# Patient Record
Sex: MALE | Race: WHITE | HISPANIC OR LATINO
[De-identification: names, ages, dates, MRNs, and addresses within clinical notes are randomized per-mention and may not be internally consistent; named-entity substitution may affect disease eponyms.]

---

## 2017-01-11 ENCOUNTER — APPOINTMENT (OUTPATIENT)
Dept: PULMONOLOGY | Facility: CLINIC | Age: 82
End: 2017-01-11

## 2017-01-11 VITALS
DIASTOLIC BLOOD PRESSURE: 70 MMHG | BODY MASS INDEX: 27.63 KG/M2 | SYSTOLIC BLOOD PRESSURE: 130 MMHG | OXYGEN SATURATION: 98 % | HEIGHT: 70 IN | WEIGHT: 193 LBS | HEART RATE: 74 BPM

## 2017-07-12 ENCOUNTER — APPOINTMENT (OUTPATIENT)
Dept: PULMONOLOGY | Facility: CLINIC | Age: 82
End: 2017-07-12

## 2017-08-14 ENCOUNTER — APPOINTMENT (OUTPATIENT)
Dept: DERMATOLOGY | Facility: CLINIC | Age: 82
End: 2017-08-14
Payer: MEDICARE

## 2017-08-14 PROCEDURE — 99202 OFFICE O/P NEW SF 15 MIN: CPT

## 2018-01-19 ENCOUNTER — APPOINTMENT (OUTPATIENT)
Dept: PULMONOLOGY | Facility: CLINIC | Age: 83
End: 2018-01-19
Payer: MEDICARE

## 2018-01-19 VITALS
WEIGHT: 183 LBS | BODY MASS INDEX: 26.26 KG/M2 | DIASTOLIC BLOOD PRESSURE: 70 MMHG | SYSTOLIC BLOOD PRESSURE: 128 MMHG | OXYGEN SATURATION: 98 % | HEART RATE: 65 BPM

## 2018-01-19 PROCEDURE — 99214 OFFICE O/P EST MOD 30 MIN: CPT | Mod: 25

## 2018-01-19 PROCEDURE — 94010 BREATHING CAPACITY TEST: CPT

## 2018-01-19 RX ORDER — NYSTATIN 100000 U/G
100000 OINTMENT TOPICAL
Qty: 60 | Refills: 0 | Status: DISCONTINUED | COMMUNITY
Start: 2017-08-22

## 2018-01-19 RX ORDER — NAPROXEN SODIUM 220 MG
220 TABLET ORAL
Refills: 0 | Status: ACTIVE | COMMUNITY

## 2018-05-30 ENCOUNTER — APPOINTMENT (OUTPATIENT)
Dept: DERMATOLOGY | Facility: CLINIC | Age: 83
End: 2018-05-30
Payer: MEDICARE

## 2018-05-30 PROCEDURE — 11900 INJECT SKIN LESIONS </W 7: CPT

## 2018-05-30 PROCEDURE — 99212 OFFICE O/P EST SF 10 MIN: CPT | Mod: 25

## 2018-07-05 ENCOUNTER — APPOINTMENT (OUTPATIENT)
Dept: DERMATOLOGY | Facility: CLINIC | Age: 83
End: 2018-07-05

## 2018-07-25 ENCOUNTER — APPOINTMENT (OUTPATIENT)
Dept: PULMONOLOGY | Facility: CLINIC | Age: 83
End: 2018-07-25

## 2018-10-18 ENCOUNTER — APPOINTMENT (OUTPATIENT)
Dept: PULMONOLOGY | Facility: CLINIC | Age: 83
End: 2018-10-18
Payer: MEDICARE

## 2018-10-18 VITALS
DIASTOLIC BLOOD PRESSURE: 76 MMHG | OXYGEN SATURATION: 99 % | SYSTOLIC BLOOD PRESSURE: 132 MMHG | WEIGHT: 178 LBS | HEART RATE: 90 BPM | BODY MASS INDEX: 25.54 KG/M2

## 2018-10-18 PROCEDURE — 94010 BREATHING CAPACITY TEST: CPT

## 2018-10-18 PROCEDURE — 99215 OFFICE O/P EST HI 40 MIN: CPT | Mod: 25

## 2018-10-18 RX ORDER — CHROMIUM 200 MCG
TABLET ORAL
Refills: 0 | Status: ACTIVE | COMMUNITY

## 2018-10-18 RX ORDER — ASCORBIC ACID 500 MG
TABLET ORAL
Refills: 0 | Status: ACTIVE | COMMUNITY

## 2019-01-09 ENCOUNTER — APPOINTMENT (OUTPATIENT)
Dept: ORTHOPEDIC SURGERY | Facility: CLINIC | Age: 84
End: 2019-01-09
Payer: MEDICARE

## 2019-01-09 VITALS
TEMPERATURE: 98.5 F | BODY MASS INDEX: 25.62 KG/M2 | HEART RATE: 88 BPM | WEIGHT: 179 LBS | HEIGHT: 70 IN | SYSTOLIC BLOOD PRESSURE: 146 MMHG | DIASTOLIC BLOOD PRESSURE: 86 MMHG

## 2019-01-09 DIAGNOSIS — M25.562 PAIN IN RIGHT KNEE: ICD-10-CM

## 2019-01-09 DIAGNOSIS — M25.561 PAIN IN RIGHT KNEE: ICD-10-CM

## 2019-01-09 PROCEDURE — 73562 X-RAY EXAM OF KNEE 3: CPT | Mod: 50

## 2019-01-09 PROCEDURE — 99204 OFFICE O/P NEW MOD 45 MIN: CPT | Mod: 25

## 2019-01-09 PROCEDURE — 20610 DRAIN/INJ JOINT/BURSA W/O US: CPT | Mod: 50

## 2019-01-09 RX ORDER — ERYTHROMYCIN 5 MG/G
5 OINTMENT OPHTHALMIC
Qty: 4 | Refills: 0 | Status: ACTIVE | COMMUNITY
Start: 2018-07-09

## 2019-01-09 RX ORDER — NEOMYCIN SULFATE, POLYMYXIN B SULFATE AND DEXAMETHASONE 3.5; 10000; 1 MG/ML; [USP'U]/ML; MG/ML
3.5-10000-0.1 SUSPENSION OPHTHALMIC
Qty: 5 | Refills: 0 | Status: ACTIVE | COMMUNITY
Start: 2018-08-31

## 2019-01-09 RX ORDER — MELOXICAM 15 MG/1
15 TABLET ORAL
Qty: 30 | Refills: 0 | Status: ACTIVE | COMMUNITY
Start: 2018-12-17

## 2019-01-09 RX ORDER — ISOSORBIDE MONONITRATE 30 MG/1
30 TABLET, EXTENDED RELEASE ORAL
Qty: 30 | Refills: 0 | Status: ACTIVE | COMMUNITY
Start: 2018-10-19

## 2019-01-09 RX ORDER — METHYLPRED ACET/NACL,ISO-OS/PF 40 MG/ML
40 VIAL (ML) INJECTION
Qty: 2 | Refills: 0 | Status: ACTIVE | COMMUNITY
Start: 2019-01-09

## 2019-01-09 NOTE — DISCUSSION/SUMMARY
[de-identified] : 89 year old male present in the office today in regards to his BL arthritis. Discussed at length the natural history of degenerative arthritis and reviewed non-operative and operative treatment. At this point I suggested physiotherapy, NSAIDs and/or Tylenol, and corticosteroid injections. I have offered him physical therapy, but pt has deferred.  He elected to receive an injection today. We talked about an injection. Discussed at length with the patient the planned steroid and lidocaine injection. The risks, benefits, convalescence and alternatives were reviewed. The possible side effects discussed included but were not limited to: pain, swelling, heat and redness. There symptoms are generally mild but if they are extensive then contact the office. Giving pain relievers by mouth such as NSAIDs or Tylenol can generally treat the reactions to steroid and lidocaine. Rare cases of infection have been noted. Rash, hives and itching may occur post injection. If you have muscle pain or cramps, flushing and or swelling of the face, rapid heartbeat, nausea, dizziness, fever, chills, headache, difficulty breathing, swelling in the arms or legs, or have a prickly feeling of your skin, contact a health care provider immediately. In the future if the pain becomes disabling may need total knee arthroplasty. FU in 8 weeks.\par \par \par \par \par

## 2019-01-09 NOTE — PROCEDURE
[de-identified] : The BL knee was prepped with Betadine and under sterile condition the 40 mg Depo-Medrol and then 5 cc Lidocaine and 20 cc Marcaine injection was performed with a 21 gauge needle. The needle was introduced into the joint, aspiration was performed to ensure intra-articular placement and the medication was injected. Upon withdrawal of the needle the site was cleaned with alcohol and a band aid applied. The patient tolerated the injection well and there were no adverse effects. Post injection instructions included no strenuous activity for 24 hours, cryotherapy and if there are any adverse effects to contact the office.\par \par \par

## 2019-01-09 NOTE — ADDENDUM
[FreeTextEntry1] : Documented by Vashti Stacy acting as a scribe for Dr. Steven on 01/09/2019 \par \par All medical record entries made by the Scribe were at my, Dr. Steven, direction and\par personally dictated by me on 01/09/2019 . I have reviewed the chart and agree that the record\par accurately reflects my personal performance of the history, physical exam, procedure and imaging.\par

## 2019-01-09 NOTE — PHYSICAL EXAM
[LE] : Sensory: Intact in bilateral lower extremities [DP] : dorsalis pedis 2+ and symmetric bilaterally [PT] : posterior tibial 2+ and symmetric bilaterally [Poor Appearance] : well-appearing [Acute Distress] : not in acute distress [de-identified] : General appearance: Well nourished, well developed, pleasant, alert, and oriented x3.\par Respiratory: Breathing not labored, in no acute distress.\par HEENT: Normocephalic. EOM intact. Sclerae are clear.\par CV: No apparent abnormalities. No lower leg edema. No varicosities. Pedal pulses are palpable.\par Neurologic: Sensation is intact to light touch in the upper and lower extremities. No muscle weakness.\par Dermatologic: No apparent skin lesions or rash.\par Spine: C spine and L spine appear normal and move freely, normal and nontender.\par Upper Extremities: Hands, wrists, and elbows are normal and move freely. Shoulders are normal and move freely. All range of motion is symmetrical.\par Normal body habitus. Pulses are palpable.\par Review of systems, please see form for complete details. Medical data sheet was reviewed.\par  \par Left knee, FROM hip, no effusion, 0 - 125 degrees, mild crepitus, no medial pain, no lateral pain, no Lachman, no pivot shift, no anterior drawer, no posterior drawer, stable, anatomic alignment. \par Right knee, FROM hip, minimal effusion, 0 - 125 degrees, no crepitus, mild medial pain, no lateral pain, no Lachman, no pivot shift, no anterior drawer, no posterior drawer, stable, varus alignment. 1QG. \par \par  [de-identified] : Xrays, taken at the office today show:\par Right Knee xrays:\par Standing AP, Lateral, and Merchant films:\par DJD, decreased medial joint space, varus alignment, grade 4 changes bilaterally, moderate patellofemoral arthritis\par \par Left Knee xrays:\par Standing AP, Lateral, and Merchant films:\par DJD, decreased medial joint space, varus alignment, grade 4 changes bilaterally, moderate patellofemoral arthritis\par \par \par \par

## 2019-01-09 NOTE — REASON FOR VISIT
[Initial Visit] : an initial visit for [Knee Pain] : knee pain [FreeTextEntry2] : Bilateral knee pain

## 2019-01-09 NOTE — HISTORY OF PRESENT ILLNESS
[Pain Location] : pain [de-identified] : Patient is a 89 year old male who presents c/o chronic bilateral knee pain, had injections 2 months ago which increased pain. patient locates pain to anterior knees bilaterally, describes as an ache. positive buckling and swelling bilaterally, no clicking or locking  patient uses Advil due to pain

## 2019-03-20 ENCOUNTER — APPOINTMENT (OUTPATIENT)
Dept: ORTHOPEDIC SURGERY | Facility: CLINIC | Age: 84
End: 2019-03-20
Payer: MEDICARE

## 2019-03-20 DIAGNOSIS — M17.0 BILATERAL PRIMARY OSTEOARTHRITIS OF KNEE: ICD-10-CM

## 2019-03-20 PROCEDURE — 99213 OFFICE O/P EST LOW 20 MIN: CPT

## 2019-03-20 NOTE — DISCUSSION/SUMMARY
[de-identified] : 89 year old male presents with bilateral knee DJD. Discussed at length the natural history of degenerative arthritis and reviewed non-operative and operative treatment. He did well with injections. He will continue Tylenol, ice.  We may consider hyaluronic acid injections if cortisone fails to improve symptoms. In the future if the pain becomes disabling may need total knee arthroplasty. The patient will follow up in 2 months with Dr. Moore due to my leaving [facility].

## 2019-03-20 NOTE — PHYSICAL EXAM
[Normal] : Gait: normal [LE] : Sensory: Intact in bilateral lower extremities [DP] : dorsalis pedis 2+ and symmetric bilaterally [PT] : posterior tibial 2+ and symmetric bilaterally [Poor Appearance] : well-appearing [Acute Distress] : not in acute distress [Obese] : not obese [de-identified] : General appearance: Well nourished, well developed, pleasant, alert, and oriented x3.\par Respiratory: Breathing not labored, in no acute distress.\par HEENT: Normocephalic. EOM intact. Sclerae are clear.\par CV: No apparent abnormalities. No lower leg edema. No varicosities. Pedal pulses are palpable.\par Neurologic: Sensation is intact to light touch in the upper and lower extremities. No muscle weakness.\par Dermatologic: No apparent skin lesions or rash.\par Spine: C spine and L spine appear normal and move freely, normal and nontender.\par Upper Extremities: Hands, wrists, and elbows are normal and move freely. Shoulders are normal and move freely. All range of motion is symmetrical.\par Normal body habitus. Pulses are palpable.\par Review of systems, please see form for complete details. Medical data sheet was reviewed.\par  \par Left knee, FROM hip, no effusion, 0 - 125 degrees, mild crepitus, no medial pain, no lateral pain, no Lachman, no pivot shift, no anterior drawer, no posterior drawer, stable, anatomic alignment. \par Right knee, FROM hip, minimal effusion, 0 - 120 degrees, no crepitus, mild medial pain, no lateral pain, no Lachman, no pivot shift, no anterior drawer, no posterior drawer, stable, varus alignment

## 2019-03-20 NOTE — ADDENDUM
[FreeTextEntry1] : I, Alonso Randall, acted solely as a scribe for Dr. Oracio Steven on 03/20/2019.\par \par All medical record entries made by the scribe were at my, Dr. Oracio Steevn, direction and personally dictated by me on 03/20/2019. I have reviewed the chart and agree that the record accurately reflects my personal performance of the history, physical exam, assessment and plan. I have also personally directed, reviewed, and agreed with the chart.

## 2019-03-20 NOTE — HISTORY OF PRESENT ILLNESS
[de-identified] : Patient is a 89 year old male who presents for follow up of bilateral knee OA. patient had injections in 1/9/19. patient states mild relief, still has some pain.  buckling and swelling resolved.  no clicking or locking

## 2019-04-17 ENCOUNTER — APPOINTMENT (OUTPATIENT)
Dept: PULMONOLOGY | Facility: CLINIC | Age: 84
End: 2019-04-17
Payer: MEDICARE

## 2019-04-17 VITALS
DIASTOLIC BLOOD PRESSURE: 70 MMHG | HEIGHT: 70 IN | WEIGHT: 179 LBS | BODY MASS INDEX: 25.62 KG/M2 | OXYGEN SATURATION: 97 % | SYSTOLIC BLOOD PRESSURE: 120 MMHG | HEART RATE: 89 BPM

## 2019-04-17 DIAGNOSIS — J45.20 MILD INTERMITTENT ASTHMA, UNCOMPLICATED: ICD-10-CM

## 2019-04-17 DIAGNOSIS — R06.09 OTHER FORMS OF DYSPNEA: ICD-10-CM

## 2019-04-17 PROCEDURE — 99215 OFFICE O/P EST HI 40 MIN: CPT | Mod: 25

## 2019-04-17 PROCEDURE — 94010 BREATHING CAPACITY TEST: CPT

## 2019-04-17 NOTE — REVIEW OF SYSTEMS
[As Noted in HPI] : as noted in HPI [Negative] : Dermatologic [FreeTextEntry7] : blood in stools, with straining non melena

## 2019-04-17 NOTE — PHYSICAL EXAM
[General Appearance - Well Developed] : well developed [Normal Appearance] : normal appearance [General Appearance - Well Nourished] : well nourished [Heart Rate And Rhythm] : heart rate and rhythm were normal [Heart Sounds] : normal S1 and S2 [Murmurs] : no murmurs present [Respiration, Rhythm And Depth] : normal respiratory rhythm and effort [Exaggerated Use Of Accessory Muscles For Inspiration] : no accessory muscle use [Auscultation Breath Sounds / Voice Sounds] : lungs were clear to auscultation bilaterally [] : no rash [No Focal Deficits] : no focal deficits [Oriented To Time, Place, And Person] : oriented to person, place, and time [Impaired Insight] : insight and judgment were intact [Affect] : the affect was normal [Memory Recent] : recent memory was not impaired [FreeTextEntry1] : no edema

## 2019-04-17 NOTE — HISTORY OF PRESENT ILLNESS
[FreeTextEntry1] : no cough or sputum\par no chest pain\par no fever, chills\par Chronic SOB on exertion\par

## 2019-04-17 NOTE — DISCUSSION/SUMMARY
[FreeTextEntry1] : asthma mild intermittent\par chronic exertional dyspnea\par exam without bronchospasm,normal sp02\par spirometry remains normal\par will obtain CXR, duplex\par  cardiology follow up\par 6 months or sooner if needed

## 2019-04-17 NOTE — CONSULT LETTER
[Dear  ___] : Dear  [unfilled], [Consult Letter:] : I had the pleasure of evaluating your patient, [unfilled]. [Please see my note below.] : Please see my note below. [Sincerely,] : Sincerely, [DrIvy  ___] : Dr. CAMPOS [[facility], [facility]] : [facility], [facility] [Director, Respiratory Care] : Director, Respiratory Care [[facility]] : [facility] [FreeTextEntry3] : [facility]\par Pulmonary Critical Care\par Director Pulmonary Division\par Medical Director Respiratory Therapy\par [facility]\par \par

## 2019-05-20 ENCOUNTER — APPOINTMENT (OUTPATIENT)
Dept: ORTHOPEDIC SURGERY | Facility: CLINIC | Age: 84
End: 2019-05-20

## 2019-10-03 ENCOUNTER — OTHER (OUTPATIENT)
Age: 84
End: 2019-10-03

## 2019-10-23 ENCOUNTER — APPOINTMENT (OUTPATIENT)
Dept: PULMONOLOGY | Facility: CLINIC | Age: 84
End: 2019-10-23

## 2019-12-10 ENCOUNTER — OTHER (OUTPATIENT)
Age: 84
End: 2019-12-10

## 2019-12-20 ENCOUNTER — APPOINTMENT (OUTPATIENT)
Dept: ORTHOPEDIC SURGERY | Facility: CLINIC | Age: 84
End: 2019-12-20

## 2019-12-31 ENCOUNTER — OUTPATIENT (OUTPATIENT)
Dept: OUTPATIENT SERVICES | Facility: HOSPITAL | Age: 84
LOS: 1 days | End: 2019-12-31
Payer: MEDICARE

## 2019-12-31 VITALS
HEIGHT: 70 IN | SYSTOLIC BLOOD PRESSURE: 121 MMHG | HEART RATE: 90 BPM | DIASTOLIC BLOOD PRESSURE: 78 MMHG | TEMPERATURE: 98 F | RESPIRATION RATE: 14 BRPM | WEIGHT: 171.96 LBS | OXYGEN SATURATION: 98 %

## 2019-12-31 DIAGNOSIS — Z98.890 OTHER SPECIFIED POSTPROCEDURAL STATES: Chronic | ICD-10-CM

## 2019-12-31 DIAGNOSIS — Z01.818 ENCOUNTER FOR OTHER PREPROCEDURAL EXAMINATION: ICD-10-CM

## 2019-12-31 DIAGNOSIS — M17.12 UNILATERAL PRIMARY OSTEOARTHRITIS, LEFT KNEE: ICD-10-CM

## 2019-12-31 LAB
ALBUMIN SERPL ELPH-MCNC: 3.4 G/DL — SIGNIFICANT CHANGE UP (ref 3.3–5)
ALP SERPL-CCNC: 140 U/L — HIGH (ref 40–120)
ALT FLD-CCNC: 24 U/L — SIGNIFICANT CHANGE UP (ref 12–78)
ANION GAP SERPL CALC-SCNC: 7 MMOL/L — SIGNIFICANT CHANGE UP (ref 5–17)
APTT BLD: 31 SEC — SIGNIFICANT CHANGE UP (ref 28.5–37)
AST SERPL-CCNC: 19 U/L — SIGNIFICANT CHANGE UP (ref 15–37)
BILIRUB SERPL-MCNC: 0.5 MG/DL — SIGNIFICANT CHANGE UP (ref 0.2–1.2)
BUN SERPL-MCNC: 26 MG/DL — HIGH (ref 7–23)
CALCIUM SERPL-MCNC: 9.3 MG/DL — SIGNIFICANT CHANGE UP (ref 8.5–10.1)
CHLORIDE SERPL-SCNC: 106 MMOL/L — SIGNIFICANT CHANGE UP (ref 96–108)
CO2 SERPL-SCNC: 28 MMOL/L — SIGNIFICANT CHANGE UP (ref 22–31)
CREAT SERPL-MCNC: 1.2 MG/DL — SIGNIFICANT CHANGE UP (ref 0.5–1.3)
GLUCOSE SERPL-MCNC: 103 MG/DL — HIGH (ref 70–99)
HBA1C BLD-MCNC: 5.5 % — SIGNIFICANT CHANGE UP (ref 4–5.6)
HCT VFR BLD CALC: 41.6 % — SIGNIFICANT CHANGE UP (ref 39–50)
HGB BLD-MCNC: 13.5 G/DL — SIGNIFICANT CHANGE UP (ref 13–17)
INR BLD: 1.1 RATIO — SIGNIFICANT CHANGE UP (ref 0.88–1.16)
MCHC RBC-ENTMCNC: 30.8 PG — SIGNIFICANT CHANGE UP (ref 27–34)
MCHC RBC-ENTMCNC: 32.5 GM/DL — SIGNIFICANT CHANGE UP (ref 32–36)
MCV RBC AUTO: 95 FL — SIGNIFICANT CHANGE UP (ref 80–100)
MRSA PCR RESULT.: SIGNIFICANT CHANGE UP
NRBC # BLD: 0 /100 WBCS — SIGNIFICANT CHANGE UP (ref 0–0)
PLATELET # BLD AUTO: 290 K/UL — SIGNIFICANT CHANGE UP (ref 150–400)
POTASSIUM SERPL-MCNC: 4.1 MMOL/L — SIGNIFICANT CHANGE UP (ref 3.5–5.3)
POTASSIUM SERPL-SCNC: 4.1 MMOL/L — SIGNIFICANT CHANGE UP (ref 3.5–5.3)
PROT SERPL-MCNC: 7.4 G/DL — SIGNIFICANT CHANGE UP (ref 6–8.3)
PROTHROM AB SERPL-ACNC: 12.6 SEC — SIGNIFICANT CHANGE UP (ref 10–12.9)
RBC # BLD: 4.38 M/UL — SIGNIFICANT CHANGE UP (ref 4.2–5.8)
RBC # FLD: 11.9 % — SIGNIFICANT CHANGE UP (ref 10.3–14.5)
S AUREUS DNA NOSE QL NAA+PROBE: SIGNIFICANT CHANGE UP
SODIUM SERPL-SCNC: 141 MMOL/L — SIGNIFICANT CHANGE UP (ref 135–145)
WBC # BLD: 8.37 K/UL — SIGNIFICANT CHANGE UP (ref 3.8–10.5)
WBC # FLD AUTO: 8.37 K/UL — SIGNIFICANT CHANGE UP (ref 3.8–10.5)

## 2019-12-31 PROCEDURE — 73560 X-RAY EXAM OF KNEE 1 OR 2: CPT | Mod: 26,LT

## 2019-12-31 PROCEDURE — 93010 ELECTROCARDIOGRAM REPORT: CPT

## 2019-12-31 PROCEDURE — 71046 X-RAY EXAM CHEST 2 VIEWS: CPT | Mod: 26

## 2019-12-31 NOTE — H&P PST ADULT - HISTORY OF PRESENT ILLNESS
89 year old male presents for PST prior to LEFT Total Knee Replacement with Dr Buck Walker on 1/13/20 - Pt notes 89 year old male presents for PST prior to LEFT Total Knee Replacement with Dr Buck Walker on 1/13/20 - Pt notes he has been very active until last month or so - had still been bowling - notes arthritis bilateral knees however notes increased knee pain LEFT knee - notes swelling left knee - decreased ROM and strength to left knee - notes x-rays show bone on bone - has been using walker/cane at home for ambulation assistance as well as use of LEFT knee Immobilizer brace - has received Cortisone injections to knee with little relief noted - has also been alternating between Meloxicam and Aleve as needed for pain which he rates is now up to #10/10 on pain scale  - reports he has recently started Tramadol for pain - Following  discussions with Dr Walker pt is electing for scheduled procedure -

## 2019-12-31 NOTE — H&P PST ADULT - NSICDXPASTMEDICALHX_GEN_ALL_CORE_FT
PAST MEDICAL HISTORY:  Dry eyes, bilateral     H/O urethral stricture     Heart murmur     Heartburn     Low back pain     Muscle cramps Notes Mostly in Toes    Shortness of breath     Unilateral primary osteoarthritis, left knee

## 2019-12-31 NOTE — H&P PST ADULT - MUSCULOSKELETAL COMMENTS
LEFT Knee Pain/Swelling - SEE HPI Left Knee swelling noted on exam  - LEFT knee Immobilizer brace in place- tenderness on exam

## 2019-12-31 NOTE — H&P PST ADULT - NSANTHOSAYNRD_GEN_A_CORE
No. SANNA screening performed.  STOP BANG Legend: 0-2 = LOW Risk; 3-4 = INTERMEDIATE Risk; 5-8 = HIGH Risk

## 2019-12-31 NOTE — H&P PST ADULT - ASSESSMENT
89 year old male with Unilateral Primary Osteoarthritis, LEFT knee - Scheduled for LEFT Total Knee Replacement with Dr Buck Walker on 1/13/20 -

## 2019-12-31 NOTE — H&P PST ADULT - MUSCULOSKELETAL
details… detailed exam joint swelling/no calf tenderness/decreased ROM/decreased ROM due to pain/diminished strength

## 2019-12-31 NOTE — H&P PST ADULT - NEGATIVE ENMT SYMPTOMS
no hearing difficulty/no throat pain/no dysphagia/no tinnitus/no vertigo/no sinus symptoms/no post-nasal discharge

## 2019-12-31 NOTE — H&P PST ADULT - NSICDXPROBLEM_GEN_ALL_CORE_FT
PROBLEM DIAGNOSES  Problem: Unilateral primary osteoarthritis, left knee  Assessment and Plan: PST Labs; CBC, CMP, Coags, Type & Screen, HgB A1C, EKG, CXR, LEFT Knee Xrays, Nose Culture (R/O MRSA/MSSA) - Medical and cardiac clearance needed - Pt and daughter Navjot instructed pt to stop any NSAIDS/Herbal Supplements/His Meloxicam/His Aleve one week prior to procedure - he may take Tylenol if needed for pain between now and procedure -Pt instructed he may take his Isosorbide and Nexium with small sip of water morning of procedure - pre-op instructions as well as pre-op wash instructions given to both pt and daughter Navjot with understanding verbalized PROBLEM DIAGNOSES  Problem: Unilateral primary osteoarthritis, left knee  Assessment and Plan: PST Labs; CBC, CMP, Coags, Type & Screen, HgB A1C, EKG, CXR, LEFT Knee Xrays, Nose Culture (R/O MRSA/MSSA) - Medical and cardiac clearance needed; appointments made - pt to see PCP on 1/8/20 and cardiologist on 1/9/20 - Pt and daughter Navjot instructed pt to stop any NSAIDS/Herbal Supplements/His Meloxicam/His Aleve one week prior to procedure - he may take Tylenol if needed for pain between now and procedure -Pt instructed he may take his Isosorbide and Nexium with small sip of water morning of procedure - pre-op instructions as well as pre-op wash instructions given to both pt and daughter Navjot with understanding verbalized

## 2020-01-12 ENCOUNTER — TRANSCRIPTION ENCOUNTER (OUTPATIENT)
Age: 85
End: 2020-01-12

## 2020-01-13 ENCOUNTER — TRANSCRIPTION ENCOUNTER (OUTPATIENT)
Age: 85
End: 2020-01-13

## 2020-01-13 ENCOUNTER — INPATIENT (INPATIENT)
Facility: HOSPITAL | Age: 85
LOS: 3 days | Discharge: HOME | DRG: 470 | End: 2020-01-17
Attending: ORTHOPAEDIC SURGERY | Admitting: ORTHOPAEDIC SURGERY
Payer: MEDICARE

## 2020-01-13 ENCOUNTER — RESULT REVIEW (OUTPATIENT)
Age: 85
End: 2020-01-13

## 2020-01-13 VITALS
DIASTOLIC BLOOD PRESSURE: 67 MMHG | SYSTOLIC BLOOD PRESSURE: 111 MMHG | OXYGEN SATURATION: 96 % | WEIGHT: 169.98 LBS | HEIGHT: 70 IN | HEART RATE: 62 BPM | RESPIRATION RATE: 17 BRPM | TEMPERATURE: 98 F

## 2020-01-13 DIAGNOSIS — Z98.890 OTHER SPECIFIED POSTPROCEDURAL STATES: Chronic | ICD-10-CM

## 2020-01-13 DIAGNOSIS — M17.12 UNILATERAL PRIMARY OSTEOARTHRITIS, LEFT KNEE: ICD-10-CM

## 2020-01-13 DIAGNOSIS — R01.1 CARDIAC MURMUR, UNSPECIFIED: ICD-10-CM

## 2020-01-13 LAB
ABO RH CONFIRMATION: SIGNIFICANT CHANGE UP
HCT VFR BLD CALC: 30.7 % — LOW (ref 39–50)
HCT VFR BLD CALC: 34.8 % — LOW (ref 39–50)
HGB BLD-MCNC: 11.2 G/DL — LOW (ref 13–17)
HGB BLD-MCNC: 9.8 G/DL — LOW (ref 13–17)
MCHC RBC-ENTMCNC: 30.9 PG — SIGNIFICANT CHANGE UP (ref 27–34)
MCHC RBC-ENTMCNC: 32.2 GM/DL — SIGNIFICANT CHANGE UP (ref 32–36)
MCV RBC AUTO: 96.1 FL — SIGNIFICANT CHANGE UP (ref 80–100)
NRBC # BLD: 0 /100 WBCS — SIGNIFICANT CHANGE UP (ref 0–0)
PLATELET # BLD AUTO: 192 K/UL — SIGNIFICANT CHANGE UP (ref 150–400)
RBC # BLD: 3.62 M/UL — LOW (ref 4.2–5.8)
RBC # FLD: 12.4 % — SIGNIFICANT CHANGE UP (ref 10.3–14.5)
WBC # BLD: 11.55 K/UL — HIGH (ref 3.8–10.5)
WBC # FLD AUTO: 11.55 K/UL — HIGH (ref 3.8–10.5)

## 2020-01-13 PROCEDURE — 73562 X-RAY EXAM OF KNEE 3: CPT | Mod: 26,LT

## 2020-01-13 PROCEDURE — 88311 DECALCIFY TISSUE: CPT | Mod: 26

## 2020-01-13 PROCEDURE — 88305 TISSUE EXAM BY PATHOLOGIST: CPT | Mod: 26

## 2020-01-13 RX ORDER — SODIUM CHLORIDE 9 MG/ML
1000 INJECTION, SOLUTION INTRAVENOUS
Refills: 0 | Status: DISCONTINUED | OUTPATIENT
Start: 2020-01-13 | End: 2020-01-13

## 2020-01-13 RX ORDER — ONDANSETRON 8 MG/1
4 TABLET, FILM COATED ORAL ONCE
Refills: 0 | Status: DISCONTINUED | OUTPATIENT
Start: 2020-01-13 | End: 2020-01-13

## 2020-01-13 RX ORDER — ONDANSETRON 8 MG/1
4 TABLET, FILM COATED ORAL EVERY 6 HOURS
Refills: 0 | Status: DISCONTINUED | OUTPATIENT
Start: 2020-01-13 | End: 2020-01-17

## 2020-01-13 RX ORDER — ACETAMINOPHEN 500 MG
1000 TABLET ORAL ONCE
Refills: 0 | Status: COMPLETED | OUTPATIENT
Start: 2020-01-14 | End: 2020-01-14

## 2020-01-13 RX ORDER — TRAMADOL HYDROCHLORIDE 50 MG/1
50 TABLET ORAL EVERY 4 HOURS
Refills: 0 | Status: DISCONTINUED | OUTPATIENT
Start: 2020-01-13 | End: 2020-01-17

## 2020-01-13 RX ORDER — ACETAMINOPHEN 500 MG
1000 TABLET ORAL EVERY 6 HOURS
Refills: 0 | Status: COMPLETED | OUTPATIENT
Start: 2020-01-13 | End: 2020-01-16

## 2020-01-13 RX ORDER — SENNA PLUS 8.6 MG/1
2 TABLET ORAL AT BEDTIME
Refills: 0 | Status: DISCONTINUED | OUTPATIENT
Start: 2020-01-13 | End: 2020-01-17

## 2020-01-13 RX ORDER — ISOSORBIDE MONONITRATE 60 MG/1
1 TABLET, EXTENDED RELEASE ORAL
Qty: 0 | Refills: 0 | DISCHARGE

## 2020-01-13 RX ORDER — ASCORBIC ACID 60 MG
500 TABLET,CHEWABLE ORAL
Refills: 0 | Status: DISCONTINUED | OUTPATIENT
Start: 2020-01-13 | End: 2020-01-17

## 2020-01-13 RX ORDER — FERROUS SULFATE 325(65) MG
325 TABLET ORAL
Refills: 0 | Status: DISCONTINUED | OUTPATIENT
Start: 2020-01-13 | End: 2020-01-17

## 2020-01-13 RX ORDER — ACETAMINOPHEN 500 MG
1000 TABLET ORAL ONCE
Refills: 0 | Status: COMPLETED | OUTPATIENT
Start: 2020-01-13 | End: 2020-01-13

## 2020-01-13 RX ORDER — CEFAZOLIN SODIUM 1 G
2000 VIAL (EA) INJECTION ONCE
Refills: 0 | Status: COMPLETED | OUTPATIENT
Start: 2020-01-13 | End: 2020-01-13

## 2020-01-13 RX ORDER — ISOSORBIDE MONONITRATE 60 MG/1
30 TABLET, EXTENDED RELEASE ORAL DAILY
Refills: 0 | Status: DISCONTINUED | OUTPATIENT
Start: 2020-01-13 | End: 2020-01-16

## 2020-01-13 RX ORDER — ESOMEPRAZOLE MAGNESIUM 40 MG/1
1 CAPSULE, DELAYED RELEASE ORAL
Qty: 0 | Refills: 0 | DISCHARGE

## 2020-01-13 RX ORDER — SODIUM CHLORIDE 9 MG/ML
1000 INJECTION INTRAMUSCULAR; INTRAVENOUS; SUBCUTANEOUS
Refills: 0 | Status: DISCONTINUED | OUTPATIENT
Start: 2020-01-13 | End: 2020-01-14

## 2020-01-13 RX ORDER — RIVAROXABAN 15 MG-20MG
10 KIT ORAL DAILY
Refills: 0 | Status: DISCONTINUED | OUTPATIENT
Start: 2020-01-14 | End: 2020-01-17

## 2020-01-13 RX ORDER — PANTOPRAZOLE SODIUM 20 MG/1
40 TABLET, DELAYED RELEASE ORAL
Refills: 0 | Status: DISCONTINUED | OUTPATIENT
Start: 2020-01-13 | End: 2020-01-17

## 2020-01-13 RX ORDER — HYDROMORPHONE HYDROCHLORIDE 2 MG/ML
0.5 INJECTION INTRAMUSCULAR; INTRAVENOUS; SUBCUTANEOUS
Refills: 0 | Status: DISCONTINUED | OUTPATIENT
Start: 2020-01-13 | End: 2020-01-13

## 2020-01-13 RX ORDER — FOLIC ACID 0.8 MG
1 TABLET ORAL DAILY
Refills: 0 | Status: DISCONTINUED | OUTPATIENT
Start: 2020-01-13 | End: 2020-01-17

## 2020-01-13 RX ORDER — BUPIVACAINE 13.3 MG/ML
20 INJECTION, SUSPENSION, LIPOSOMAL INFILTRATION ONCE
Refills: 0 | Status: DISCONTINUED | OUTPATIENT
Start: 2020-01-13 | End: 2020-01-13

## 2020-01-13 RX ORDER — CEFAZOLIN SODIUM 1 G
2000 VIAL (EA) INJECTION EVERY 8 HOURS
Refills: 0 | Status: COMPLETED | OUTPATIENT
Start: 2020-01-13 | End: 2020-01-14

## 2020-01-13 RX ADMIN — Medication 1000 MILLIGRAM(S): at 13:47

## 2020-01-13 RX ADMIN — Medication 400 MILLIGRAM(S): at 20:28

## 2020-01-13 RX ADMIN — Medication 100 MILLIGRAM(S): at 17:47

## 2020-01-13 RX ADMIN — Medication 400 MILLIGRAM(S): at 13:13

## 2020-01-13 RX ADMIN — HYDROMORPHONE HYDROCHLORIDE 0.5 MILLIGRAM(S): 2 INJECTION INTRAMUSCULAR; INTRAVENOUS; SUBCUTANEOUS at 13:32

## 2020-01-13 RX ADMIN — Medication 500 MILLIGRAM(S): at 17:47

## 2020-01-13 RX ADMIN — SODIUM CHLORIDE 75 MILLILITER(S): 9 INJECTION, SOLUTION INTRAVENOUS at 13:34

## 2020-01-13 RX ADMIN — SODIUM CHLORIDE 50 MILLILITER(S): 9 INJECTION, SOLUTION INTRAVENOUS at 09:00

## 2020-01-13 RX ADMIN — Medication 1000 MILLIGRAM(S): at 21:00

## 2020-01-13 RX ADMIN — Medication 325 MILLIGRAM(S): at 17:47

## 2020-01-13 RX ADMIN — HYDROMORPHONE HYDROCHLORIDE 0.5 MILLIGRAM(S): 2 INJECTION INTRAMUSCULAR; INTRAVENOUS; SUBCUTANEOUS at 13:47

## 2020-01-13 NOTE — CONSULT NOTE ADULT - SUBJECTIVE AND OBJECTIVE BOX
Patient is a 89y old  Male who presents with a chief complaint of Pt states " I am having my LEFT Knee Replaced." (31 Dec 2019 09:30)  feels very well presently, other than mild left knee pain      INTERVAL HPI/OVERNIGHT EVENTS:  T(C): 36.4 (01-13-20 @ 13:54), Max: 36.7 (01-13-20 @ 09:01)  HR: 91 (01-13-20 @ 13:54) (62 - 91)  BP: 105/64 (01-13-20 @ 13:54) (103/62 - 177/91)  RR: 16 (01-13-20 @ 13:54) (14 - 17)  SpO2: 98% (01-13-20 @ 13:54) (96% - 100%)  Wt(kg): --  I&O's Summary      PAST MEDICAL & SURGICAL HISTORY:  Dry eyes, bilateral  H/O urethral stricture  Muscle cramps: Notes Mostly in Toes  Low back pain  Heartburn  Shortness of breath  Heart murmur  Unilateral primary osteoarthritis, left knee  H/O endoscopy  H/O colonoscopy  S/P release of urethral stricture      SOCIAL HISTORY  Alcohol: neg  Tobacco: neg  Illicit substance use: neg      FAMILY HISTORY: non contrib    Home Medications:  Aleve 220 mg oral tablet: 1 tab(s) orally 2 times a day, As Needed - for moderate pain (13 Jan 2020 09:22)  isosorbide mononitrate 30 mg oral tablet, extended release: 1 tab(s) orally once a day (in the morning) (13 Jan 2020 09:22)  meloxicam 15 mg oral tablet: 1 tab(s) orally once a day- IN AM (13 Jan 2020 09:22)  NexIUM 20 mg oral delayed release capsule: 1 cap(s) orally once a day- IN AM (13 Jan 2020 09:22)  traMADol 50 mg oral tablet: 1 tab(s) orally 2 times a day (13 Jan 2020 09:22)        LABS:                        11.2   11.55 )-----------( 192      ( 13 Jan 2020 14:22 )             34.8               CAPILLARY BLOOD GLUCOSE      POCT Blood Glucose.: 111 mg/dL (13 Jan 2020 13:05)  POCT Blood Glucose.: 86 mg/dL (13 Jan 2020 08:27)            MEDICATIONS  (STANDING):  acetaminophen  IVPB .. 1000 milliGRAM(s) IV Intermittent once  ascorbic acid 500 milliGRAM(s) Oral two times a day  ceFAZolin   IVPB 2000 milliGRAM(s) IV Intermittent every 8 hours  ferrous    sulfate 325 milliGRAM(s) Oral three times a day with meals  folic acid 1 milliGRAM(s) Oral daily  isosorbide   mononitrate ER Tablet (IMDUR) 30 milliGRAM(s) Oral daily  lactated ringers. 1000 milliLiter(s) (75 mL/Hr) IV Continuous <Continuous>  multivitamin 1 Tablet(s) Oral daily  pantoprazole    Tablet 40 milliGRAM(s) Oral before breakfast    MEDICATIONS  (PRN):  acetaminophen   Tablet .. 1000 milliGRAM(s) Oral every 6 hours PRN Mild Pain (1 - 3)  ondansetron Injectable 4 milliGRAM(s) IV Push every 6 hours PRN Nausea and/or Vomiting  senna 2 Tablet(s) Oral at bedtime PRN Constipation  traMADol 50 milliGRAM(s) Oral every 4 hours PRN MODERATE TO SEVERE PAIN      REVIEW OF SYSTEMS:  CONSTITUTIONAL: No fever, weight loss, or fatigue  EYES: No eye pain, visual disturbances, or discharge  ENMT:  No difficulty hearing, tinnitus, vertigo; No sinus or throat pain  NECK: No pain or stiffness  RESPIRATORY: No cough, wheezing, chills or hemoptysis; No shortness of breath  CARDIOVASCULAR: No chest pain, palpitations, dizziness, or leg swelling  GASTROINTESTINAL: No abdominal or epigastric pain. No nausea, vomiting, or hematemesis; No diarrhea or constipation. No melena or hematochezia.  GENITOURINARY: No dysuria, frequency, hematuria, or incontinence  NEUROLOGICAL: No headaches, memory loss, loss of strength, numbness, or tremors  SKIN: No itching, burning, rashes, or lesions   LYMPH NODES: No enlarged glands  ENDOCRINE: No heat or cold intolerance; No hair loss  MUSCULOSKELETAL: chronic left knee pain  PSYCHIATRIC: No depression, anxiety, mood swings, or difficulty sleeping  HEME/LYMPH: No easy bruising, or bleeding gums  ALLERY AND IMMUNOLOGIC: No hives or eczema    RADIOLOGY & ADDITIONAL TESTS:    Imaging Personally Reviewed:  [x ] YES  [ ] NO    Consultant(s) Notes Reviewed:  [x ] YES  [ ] NO        PHYSICAL EXAM:  GENERAL: NAD, well-groomed, well-developed  HEAD:  Atraumatic, Normocephalic  EYES: EOMI, PERRLA, conjunctiva and sclera clear  ENMT: No tonsillar erythema, exudates, or enlargement; Moist mucous membranes, Good dentition, No lesions  NECK: Supple, No JVD, Normal thyroid  NERVOUS SYSTEM:  Alert & Oriented X3, Good concentration; Motor Strength 5/5 B/L upper and lower extremities; DTRs 2+ intact and symmetric  CHEST/LUNG: Clear to percussion bilaterally; No rales, rhonchi, wheezing, or rubs  HEART: Regular rate and rhythm; No murmurs, rubs, or gallops  ABDOMEN: Soft, Nontender, Nondistended; Bowel sounds present  EXTREMITIES:  2+ Peripheral Pulses, No clubbing, cyanosis, or edema  LYMPH: No lymphadenopathy noted  SKIN: No rashes or lesions    Care Discussed with Consultants/Other Providers [ ] YES  [ ] NO

## 2020-01-13 NOTE — PATIENT PROFILE ADULT - NSASFALLATTEMPTOOB_GEN_A_NUR
Frankie Quezada a physical therapist with [facility] called requesting verbal orders to resume physical therapy visits with Avelina.   no

## 2020-01-13 NOTE — PROGRESS NOTE ADULT - SUBJECTIVE AND OBJECTIVE BOX
Patient seen and examined. Pain controlled.    Vital Signs Last 24 Hrs  T(C): 36.7 (13 Jan 2020 15:43), Max: 36.7 (13 Jan 2020 09:01)  T(F): 98 (13 Jan 2020 15:43), Max: 98.1 (13 Jan 2020 09:01)  HR: 105 (13 Jan 2020 15:43) (62 - 105)  BP: 95/65 (13 Jan 2020 16:05) (95/65 - 177/91)  BP(mean): --  RR: 17 (13 Jan 2020 15:43) (14 - 17)  SpO2: 97% (13 Jan 2020 15:43) (96% - 100%)    Physical exam  Gen: NAD  LLE: Dressing clean, dry, and intact. +EHL/FHL/TA/GSC. SILT L3-S1. +Dorsalis pedis, capillary refill brisk. Compartments soft and nontender.    89M s/p L TKA POD# 0    WBAT LLE  Pain control  DVT prophylaxis  PT  Discharge planning

## 2020-01-13 NOTE — PHYSICAL THERAPY INITIAL EVALUATION ADULT - TRANSFER TRAINING, PT EVAL
Patient will transfer from all surfaces with CG in order to increase ability to safely maneuver bewteen surfaces in 1 week

## 2020-01-13 NOTE — DISCHARGE NOTE PROVIDER - HOSPITAL COURSE
THIS IS A CASE OF A 89 YO MALE EVALUATED IN THE OFFICE FOR  LEFT KNEE PAIN WITH SEVERE ENDSTAGE OSTEOARTHRITIS.         PAST MEDICAL & SURGICAL HISTORY:        Dry eyes, bilateral (H04.123)    H/O urethral stricture (Z87.448)    Muscle cramps (R25.2): Notes Mostly in Toes    Low back pain (M54.5)    Heartburn (R12)    Shortness of breath (R06.02)    Heart murmur (R01.1)    Unilateral primary osteoarthritis, left knee (M17.12)    H/O endoscopy (Z98.890)    H/O colonoscopy (Z98.890)    S/P release of urethral stricture (Z98.890)        Home Medications:        isosorbide mononitrate 30 mg oral tablet, extended release: 1 tab(s) orally once a day (in the morning) (13 Jan 2020 09:22)    Multiple Vitamins oral tablet: 1 tab(s) orally once a day (14 Jan 2020 18:53)    NexIUM 20 mg oral delayed release capsule: 1 cap(s) orally once a day- IN AM (13 Jan 2020 09:22)    traMADol 50 mg oral tablet: 1 tab(s) orally 4 times a day, As Needed (14 Jan 2020 18:53)        Allergies        sulfa drugs (Other)        HOSPITAL COURSE: AFTER THE RISK AND BENEFITS OF SURGICAL INTERVENTION IN DETAILS WERE DISCUSSED WITH THE PATIENT, A CONSENT WAS OBTAINED. AFTER OBTAINING MEDICAL CLEARANCE AND PREOPERATIVE EVALUATION, THE PATIENT WAS TAKEN TO THE OPERATING ROOM ON 01/13/2020 AND THE PATIENT UNDERWENT A  LEFT TOTAL KNEE REPLACEMENT.        POSTOPERATIVE PHASE, THE PATIENT WAS ANTICOAGULATED WITH XARELTO AND WAS GIVEN 24 HOURS OF IV ANTIBIOTICS. A SOCIAL SERVICE CONSULT WAS OBTAINED FOR DISCHARGE PLANNING. A PHYSICAL THERAPY CONSULT WAS OBTAINED FOR  WEIGHT BEARING AS TOLERATED.          DUE TO ANEMIA OF ACUTE BLOOD LOSS WITH HYPOTENSION POST OP  IN ADDITION TO IRON SUPPLEMENT ONE UNIT PRBC GIVEN.         DISPOSITION : REHAB. WEIGHT BEARING AS TOLERATED. XARELTO 10 MG DAILY FOR TOTAL OF 12 DAYS AS OF 01/14/2020. FOLLOW UP WITH DR. BRITO AFTER REHAB.    KEEP KNEE AQUACEL  DRESSING  ON DRY AND CLEAN, MAY REMOVE 8 DAYS POST HOSPITAL DISCHARGE .

## 2020-01-13 NOTE — PHYSICAL THERAPY INITIAL EVALUATION ADULT - GAIT TRAINING, PT EVAL
Patient will ambulate x 100 feet with RW with CG in order to increase ability to navigate throughout home in 1 week

## 2020-01-13 NOTE — DISCHARGE NOTE PROVIDER - NSDCCPCAREPLAN_GEN_ALL_CORE_FT
PRINCIPAL DISCHARGE DIAGNOSIS  Diagnosis: Unilateral primary osteoarthritis, left knee  Assessment and Plan of Treatment:

## 2020-01-13 NOTE — PHYSICAL THERAPY INITIAL EVALUATION ADULT - RANGE OF MOTION EXAMINATION, REHAB EVAL
Right LE ROM was WNL (within normal limits)/L Knee: 0-90/bilateral upper extremity ROM was WNL (within normal limits)

## 2020-01-13 NOTE — PHYSICAL THERAPY INITIAL EVALUATION ADULT - ADDITIONAL COMMENTS
Patient lives with son in private home + MAXIMO with 2 wide railings.  Patient's bedroom and bathroom are on second floor.  Up until a few weeks ago, patient was independent in all ADLs and ambulation with RW.  Patient has tub shower with seat and + 2 grab bars.

## 2020-01-13 NOTE — DISCHARGE NOTE PROVIDER - NSDCFUADDINST_GEN_ALL_CORE_FT
WEIGHT BEARING AS TOLERATED.  XARELTO 10 MG DAILY FOR TOTAL OF 12 DAYS AS OF 01/14/2020  FOLLOW UP WITH DR. BRITO AFTER REHAB. CALL [phone] FOR AN APPOINTMENT.  KEEP KNEE AQUACEL  DRESSING  ON DRY AND CLEAN, MAY REMOVE 8 DAYS POST HOSPITAL DISCHARGE .

## 2020-01-13 NOTE — DISCHARGE NOTE PROVIDER - CARE PROVIDER_API CALL
Buck Walker)  Orthopaedic Surgery  [address] [place] [address]  Phone: [phone]  Fax: [phone]  Follow Up Time:

## 2020-01-13 NOTE — DISCHARGE NOTE PROVIDER - NSDCMRMEDTOKEN_GEN_ALL_CORE_FT
Aleve 220 mg oral tablet: 1 tab(s) orally 2 times a day, As Needed - for moderate pain  isosorbide mononitrate 30 mg oral tablet, extended release: 1 tab(s) orally once a day (in the morning)  meloxicam 15 mg oral tablet: 1 tab(s) orally once a day- IN AM  NexIUM 20 mg oral delayed release capsule: 1 cap(s) orally once a day- IN AM  traMADol 50 mg oral tablet: 1 tab(s) orally 2 times a day acetaminophen 500 mg oral tablet: 2 tab(s) orally every 6 hours, As needed, Mild Pain (1 - 3)  ascorbic acid 500 mg oral tablet: 1 tab(s) orally 2 times a day  Colace 100 mg oral capsule: 1 cap(s) orally 2 times a day  FeroSul 325 mg (65 mg elemental iron) oral tablet: 1 tab(s) orally 2 times a day  folic acid 1 mg oral tablet: 1 tab(s) orally once a day  isosorbide mononitrate 30 mg oral tablet, extended release: 1 tab(s) orally once a day (in the morning)  Multiple Vitamins oral tablet: 1 tab(s) orally once a day  NexIUM 20 mg oral delayed release capsule: 1 cap(s) orally once a day- IN AM  rivaroxaban 10 mg oral tablet: 1 tab(s) orally once a day    FOR TOTAL 12 DAYS AS OF 01/14/2020  traMADol 50 mg oral tablet: 1 tab(s) orally 4 times a day, As Needed

## 2020-01-14 DIAGNOSIS — D50.0 IRON DEFICIENCY ANEMIA SECONDARY TO BLOOD LOSS (CHRONIC): ICD-10-CM

## 2020-01-14 LAB
ANION GAP SERPL CALC-SCNC: 10 MMOL/L — SIGNIFICANT CHANGE UP (ref 5–17)
BUN SERPL-MCNC: 27 MG/DL — HIGH (ref 7–23)
CALCIUM SERPL-MCNC: 7.7 MG/DL — LOW (ref 8.5–10.1)
CHLORIDE SERPL-SCNC: 105 MMOL/L — SIGNIFICANT CHANGE UP (ref 96–108)
CO2 SERPL-SCNC: 24 MMOL/L — SIGNIFICANT CHANGE UP (ref 22–31)
CREAT SERPL-MCNC: 1.4 MG/DL — HIGH (ref 0.5–1.3)
GLUCOSE SERPL-MCNC: 119 MG/DL — HIGH (ref 70–99)
HCT VFR BLD CALC: 25.5 % — LOW (ref 39–50)
HGB BLD-MCNC: 8.3 G/DL — LOW (ref 13–17)
MCHC RBC-ENTMCNC: 30.6 PG — SIGNIFICANT CHANGE UP (ref 27–34)
MCHC RBC-ENTMCNC: 32.5 GM/DL — SIGNIFICANT CHANGE UP (ref 32–36)
MCV RBC AUTO: 94.1 FL — SIGNIFICANT CHANGE UP (ref 80–100)
NRBC # BLD: 0 /100 WBCS — SIGNIFICANT CHANGE UP (ref 0–0)
PLATELET # BLD AUTO: 182 K/UL — SIGNIFICANT CHANGE UP (ref 150–400)
POTASSIUM SERPL-MCNC: 4.5 MMOL/L — SIGNIFICANT CHANGE UP (ref 3.5–5.3)
POTASSIUM SERPL-SCNC: 4.5 MMOL/L — SIGNIFICANT CHANGE UP (ref 3.5–5.3)
RBC # BLD: 2.71 M/UL — LOW (ref 4.2–5.8)
RBC # FLD: 12.6 % — SIGNIFICANT CHANGE UP (ref 10.3–14.5)
SODIUM SERPL-SCNC: 139 MMOL/L — SIGNIFICANT CHANGE UP (ref 135–145)
WBC # BLD: 15.85 K/UL — HIGH (ref 3.8–10.5)
WBC # FLD AUTO: 15.85 K/UL — HIGH (ref 3.8–10.5)

## 2020-01-14 RX ORDER — FOLIC ACID 0.8 MG
1 TABLET ORAL
Qty: 0 | Refills: 0 | DISCHARGE
Start: 2020-01-14

## 2020-01-14 RX ORDER — DOCUSATE SODIUM 100 MG
1 CAPSULE ORAL
Qty: 0 | Refills: 0 | DISCHARGE

## 2020-01-14 RX ORDER — FERROUS SULFATE 325(65) MG
1 TABLET ORAL
Qty: 0 | Refills: 0 | DISCHARGE
Start: 2020-01-14

## 2020-01-14 RX ORDER — ASCORBIC ACID 60 MG
1 TABLET,CHEWABLE ORAL
Qty: 0 | Refills: 0 | DISCHARGE
Start: 2020-01-14

## 2020-01-14 RX ORDER — TRAMADOL HYDROCHLORIDE 50 MG/1
1 TABLET ORAL
Qty: 0 | Refills: 0 | DISCHARGE

## 2020-01-14 RX ORDER — MELOXICAM 15 MG/1
1 TABLET ORAL
Qty: 0 | Refills: 0 | DISCHARGE

## 2020-01-14 RX ORDER — ACETAMINOPHEN 500 MG
2 TABLET ORAL
Qty: 0 | Refills: 0 | DISCHARGE
Start: 2020-01-14

## 2020-01-14 RX ORDER — RIVAROXABAN 15 MG-20MG
1 KIT ORAL
Qty: 0 | Refills: 0 | DISCHARGE
Start: 2020-01-14

## 2020-01-14 RX ADMIN — Medication 325 MILLIGRAM(S): at 11:27

## 2020-01-14 RX ADMIN — Medication 500 MILLIGRAM(S): at 17:31

## 2020-01-14 RX ADMIN — PANTOPRAZOLE SODIUM 40 MILLIGRAM(S): 20 TABLET, DELAYED RELEASE ORAL at 05:20

## 2020-01-14 RX ADMIN — TRAMADOL HYDROCHLORIDE 50 MILLIGRAM(S): 50 TABLET ORAL at 20:50

## 2020-01-14 RX ADMIN — Medication 325 MILLIGRAM(S): at 08:29

## 2020-01-14 RX ADMIN — Medication 1000 MILLIGRAM(S): at 13:55

## 2020-01-14 RX ADMIN — Medication 1000 MILLIGRAM(S): at 05:00

## 2020-01-14 RX ADMIN — Medication 400 MILLIGRAM(S): at 04:22

## 2020-01-14 RX ADMIN — TRAMADOL HYDROCHLORIDE 50 MILLIGRAM(S): 50 TABLET ORAL at 06:30

## 2020-01-14 RX ADMIN — TRAMADOL HYDROCHLORIDE 50 MILLIGRAM(S): 50 TABLET ORAL at 19:47

## 2020-01-14 RX ADMIN — Medication 500 MILLIGRAM(S): at 05:20

## 2020-01-14 RX ADMIN — Medication 1 TABLET(S): at 11:26

## 2020-01-14 RX ADMIN — Medication 1000 MILLIGRAM(S): at 14:30

## 2020-01-14 RX ADMIN — Medication 100 MILLIGRAM(S): at 02:35

## 2020-01-14 RX ADMIN — RIVAROXABAN 10 MILLIGRAM(S): KIT at 11:26

## 2020-01-14 RX ADMIN — ISOSORBIDE MONONITRATE 30 MILLIGRAM(S): 60 TABLET, EXTENDED RELEASE ORAL at 11:26

## 2020-01-14 RX ADMIN — TRAMADOL HYDROCHLORIDE 50 MILLIGRAM(S): 50 TABLET ORAL at 05:24

## 2020-01-14 RX ADMIN — SENNA PLUS 2 TABLET(S): 8.6 TABLET ORAL at 08:29

## 2020-01-14 RX ADMIN — Medication 325 MILLIGRAM(S): at 17:31

## 2020-01-14 RX ADMIN — Medication 1 MILLIGRAM(S): at 11:26

## 2020-01-14 NOTE — OCCUPATIONAL THERAPY INITIAL EVALUATION ADULT - DIAGNOSIS, OT EVAL
Patient presents with decreased balance LLE strength, bed mobility, functional transfers and endurance impacting ability to perform ADLs and functional mobility

## 2020-01-14 NOTE — PROGRESS NOTE ADULT - SUBJECTIVE AND OBJECTIVE BOX
TEODORO GILMAR      89y   male  MRN-[id number]    ORTHOPEDIC SURGERY / DR. BRITO    POD # 1    Vital Signs Last 24 Hrs  T(C): 36.4 (14 Jan 2020 04:26), Max: 36.8 (13 Jan 2020 20:16)  T(F): 97.6 (14 Jan 2020 04:26), Max: 98.2 (13 Jan 2020 20:16)  HR: 58 (14 Jan 2020 04:26) (58 - 105)  BP: 135/67 (14 Jan 2020 04:26) (94/64 - 177/91)  BP(mean): --  RR: 16 (14 Jan 2020 04:26) (14 - 17)  SpO2: 97% (14 Jan 2020 04:26) (96% - 100%)    LEFT KNEE :    DRESSING DRY AND INTACT  GOOD MOTOR TO LEFT LOWER EXTREMITY  NEURO-VASCULAR STATUS INTACT  NO CALF TENDERNESS    POST OP    Hemoglobin: 9.8 (01-13 @ 19:25)   Hemoglobin: 11.2 (01-13 @ 14:22)    Hematocrit: 30.7 (01-13 @ 19:25)  Hematocrit: 34.8 (01-13 @ 14:22)                           ASSESSMENT &  PLAN:  POD # 1 S/P LEFT TOTAL KNEE REPLACEMENT    WEIGHT  BEARING AS TOLERATED, OOB AND AMBULATE, PHYSICAL THERAPY   DVT PROPHYLAXIS XARELTO 10 MG DAILY   F/U AM LABS   DISCHARGE PLANNING TO REHAB

## 2020-01-14 NOTE — PROGRESS NOTE ADULT - SUBJECTIVE AND OBJECTIVE BOX
Patient is a 89y old  Male who presents with a chief complaint of LEFT KNEE END STAGE OSTEOARTHRITIS  LEFT TOTAL KNEE REPLACEMENT (13 Jan 2020 19:53)  feels well, without complaints    INTERVAL HPI/OVERNIGHT EVENTS:  T(C): 36.6 (01-14-20 @ 12:34), Max: 36.8 (01-13-20 @ 20:16)  HR: 80 (01-14-20 @ 12:34) (58 - 105)  BP: 112/66 (01-14-20 @ 12:34) (94/64 - 149/73)  RR: 18 (01-14-20 @ 12:34) (14 - 18)  SpO2: 98% (01-14-20 @ 12:34) (97% - 100%)  Wt(kg): --  I&O's Summary    13 Jan 2020 07:01  -  14 Jan 2020 07:00  --------------------------------------------------------  IN: 240 mL / OUT: 300 mL / NET: -60 mL    14 Jan 2020 07:01  -  14 Jan 2020 12:57  --------------------------------------------------------  IN: 0 mL / OUT: 300 mL / NET: -300 mL        LABS:                        8.3    15.85 )-----------( 182      ( 14 Jan 2020 06:03 )             25.5     01-14    139  |  105  |  27<H>  ----------------------------<  119<H>  4.5   |  24  |  1.40<H>    Ca    7.7<L>      14 Jan 2020 06:03          CAPILLARY BLOOD GLUCOSE      POCT Blood Glucose.: 111 mg/dL (13 Jan 2020 13:05)            MEDICATIONS  (STANDING):  ascorbic acid 500 milliGRAM(s) Oral two times a day  ferrous    sulfate 325 milliGRAM(s) Oral three times a day with meals  folic acid 1 milliGRAM(s) Oral daily  isosorbide   mononitrate ER Tablet (IMDUR) 30 milliGRAM(s) Oral daily  multivitamin 1 Tablet(s) Oral daily  pantoprazole    Tablet 40 milliGRAM(s) Oral before breakfast  rivaroxaban 10 milliGRAM(s) Oral daily    MEDICATIONS  (PRN):  acetaminophen   Tablet .. 1000 milliGRAM(s) Oral every 6 hours PRN Mild Pain (1 - 3)  ondansetron Injectable 4 milliGRAM(s) IV Push every 6 hours PRN Nausea and/or Vomiting  senna 2 Tablet(s) Oral at bedtime PRN Constipation  traMADol 50 milliGRAM(s) Oral every 4 hours PRN MODERATE TO SEVERE PAIN      REVIEW OF SYSTEMS:  CONSTITUTIONAL: No fever, weight loss, or fatigue  EYES: No eye pain, visual disturbances, or discharge  ENMT:  No difficulty hearing, tinnitus, vertigo; No sinus or throat pain  NECK: No pain or stiffness  RESPIRATORY: No cough, wheezing, chills or hemoptysis; No shortness of breath  CARDIOVASCULAR: No chest pain, palpitations, dizziness, or leg swelling  GASTROINTESTINAL: No abdominal or epigastric pain. No nausea, vomiting, or hematemesis; No diarrhea or constipation. No melena or hematochezia.  GENITOURINARY: No dysuria, frequency, hematuria, or incontinence  NEUROLOGICAL: No headaches, memory loss, loss of strength, numbness, or tremors  SKIN: No itching, burning, rashes, or lesions   LYMPH NODES: No enlarged glands  ENDOCRINE: No heat or cold intolerance; No hair loss  MUSCULOSKELETAL: chronic left knee pain  PSYCHIATRIC: No depression, anxiety, mood swings, or difficulty sleeping  HEME/LYMPH: No easy bruising, or bleeding gums  ALLERY AND IMMUNOLOGIC: No hives or eczema    RADIOLOGY & ADDITIONAL TESTS:    Imaging Personally Reviewed:  [ ] YES  [ ] NO    Consultant(s) Notes Reviewed:  [ ] YES  [ ] NO    PHYSICAL EXAM:  GENERAL: NAD, well-groomed, well-developed  HEAD:  Atraumatic, Normocephalic  EYES: EOMI, PERRLA, conjunctiva and sclera clear  ENMT: No tonsillar erythema, exudates, or enlargement; Moist mucous membranes, Good dentition, No lesions  NECK: Supple, No JVD, Normal thyroid  NERVOUS SYSTEM:  Alert & Oriented X3, Good concentration; Motor Strength 5/5 B/L upper and lower extremities; DTRs 2+ intact and symmetric  CHEST/LUNG: Clear to percussion bilaterally; No rales, rhonchi, wheezing, or rubs  HEART: Regular rate and rhythm; No murmurs, rubs, or gallops  ABDOMEN: Soft, Nontender, Nondistended; Bowel sounds present  EXTREMITIES:  2+ Peripheral Pulses, No clubbing, cyanosis, or edema  LYMPH: No lymphadenopathy noted  SKIN: No rashes or lesions    Care Discussed with Consultants/Other Providers [ ] YES  [ ] NO

## 2020-01-14 NOTE — OCCUPATIONAL THERAPY INITIAL EVALUATION ADULT - PERTINENT HX OF CURRENT PROBLEM, REHAB EVAL
Patient is a 89y old  Male who presents with a chief complaint of LEFT KNEE END STAGE OSTEOARTHRITIS

## 2020-01-14 NOTE — OCCUPATIONAL THERAPY INITIAL EVALUATION ADULT - LIGHT TOUCH SENSATION, RUE, REHAB EVAL
Hyperbaric/Wound Care Patient Call    Who's Calling: Beth with [facility]  Their Callback #: [phone]    Patient : 1974    Problem/Reason for Call: Beth with [facility] called, she would like to discuss frequency of home visits         1) What symptoms are you experiencing? NA     2) Other: Also she is questioning why patient is only coming to clinic once a week    Active Problems:   Patient Active Problem List   Diagnosis   • Multiple sclerosis (CMS/HCC)   • Right sided weakness   • HTN (hypertension)   • Hypothyroidism   • Other psoriasis   • Chronic venous hypertension   • Other lymphedema   • Hyperlipemia   • Peripheral neuropathy   • Vitamin D deficiency   • Hypersomnolence   • Chronic venous stasis dermatitis   • Symptomatic varicose veins   • Type II or unspecified type diabetes mellitus with peripheral circulatory disorders, uncontrolled(250.72)   • Proteinuria   • Non-pressure chronic ulcer oth prt left foot w unsp severity (CMS/[facility])   • Morbid obesity with BMI of 40.0-44.9, adult (CMS/[facility])   • Acute UTI (urinary tract infection)   • Neurogenic bladder   • Type 2 diabetes mellitus with neurologic complication (CMS/[facility])   • Status post transmetatarsal amputation of left foot (CMS/[facility])   • Gait abnormality   • Urinary retention-Straight cath's due to MS   • Skin flap necrosis (CMS/[facility])   • PVD (peripheral vascular disease) (CMS/[facility])   • Type 2 diabetes mellitus with diabetic nephropathy, with long-term current use of insulin (CMS/[facility])   • Chronic pain syndrome   • Diabetic ulcer of right midfoot associated with type 2 diabetes mellitus, with fat layer exposed (CMS/[facility])   • Scrotal infection   • Suprapubic catheter (CMS/[facility])   • Abnormal urine   • Type 2 diabetes mellitus without complication, with long-term current use of insulin (CMS/[facility])   • Multiple sclerosis (CMS/[facility])   • Weakness of right leg   • Hypothyroid   • HLD (hyperlipidemia)   • HTN (hypertension)   • Neurogenic  bladder   • PAD (peripheral artery disease) (CMS/HCC)   • Debility   • Anxiety and depression       Next Appointment Date: 12/12/2018    Last Appointment Date: 11/9/2018    Pharmacy Name/Phone Number Confirmed in Meds & Orders Tab: no    Patient Phone Number(s): HOME: [phone]   WORK: N/A   CELL: [phone]         [ Please inform the patient that their call will be returned within 24 hours]     within normal limits

## 2020-01-14 NOTE — OCCUPATIONAL THERAPY INITIAL EVALUATION ADULT - ADDITIONAL COMMENTS
Pt lives with son in a private home. 10 patrick, +2hand rails additional 5 steps to main floor. +tub shower, +seat in place +grab bars, PTA pt was independent in adl's but recently started to use a rolling walker sec to knee pain

## 2020-01-14 NOTE — PROGRESS NOTE ADULT - SUBJECTIVE AND OBJECTIVE BOX
GILMAR VALERIO      89y   male  MRN-[id number]    ORTHOPEDIC SURGERY FOLLOW UP / DR. BRITO    Vital Signs Last 24 Hrs  T(C): 37.1 (14 Jan 2020 15:51), Max: 37.1 (14 Jan 2020 15:51)  T(F): 98.7 (14 Jan 2020 15:51), Max: 98.7 (14 Jan 2020 15:51)  HR: 74 (14 Jan 2020 15:51) (58 - 97)  BP: 101/55 (14 Jan 2020 15:51) (94/64 - 149/73)  BP(mean): --  RR: 18 (14 Jan 2020 15:51) (16 - 18)  SpO2: 98% (14 Jan 2020 15:51) (96% - 100%)      Hemoglobin: 8.3 (01-14 @ 06:03)  Hemoglobin: 9.8 (01-13 @ 19:25)  Hemoglobin: 11.2 (01-13 @ 14:22)    Hematocrit: 25.5 (01-14 @ 06:03)  Hematocrit: 30.7 (01-13 @ 19:25)  Hematocrit: 34.8 (01-13 @ 14:22)    01-14    139  |  105  |  27<H>  ----------------------------<  119<H>  4.5   |  24  |  1.40<H>    Ca    7.7<L>      14 Jan 2020 06:03                            ANEMIA POST OP ACUTE BLOOD LOSS WITH HYPOTENSION  ONE UNIT PRBC TRANSFUSED

## 2020-01-14 NOTE — PROGRESS NOTE ADULT - SUBJECTIVE AND OBJECTIVE BOX
DEPARTMENT OF ANESTHESIA  POST ANESTHETIC EVALUATION    The Patient was interviewed and evaluated    Vital Signs Last 24 Hrs  T(C): 36.7 (14 Jan 2020 07:25), Max: 36.8 (13 Jan 2020 20:16)  T(F): 98.1 (14 Jan 2020 07:25), Max: 98.2 (13 Jan 2020 20:16)  HR: 97 (14 Jan 2020 07:25) (58 - 105)  BP: 137/68 (14 Jan 2020 07:25) (94/64 - 177/91)  BP(mean): --  RR: 16 (14 Jan 2020 07:25) (14 - 17)  SpO2: 97% (14 Jan 2020 07:25) (96% - 100%)    Evaluation:      (x ) No apparent complications or complaints regarding anesthesia care at this time  (x ) All questions were answered    Condition:  (x ) Stable      ( ) Guarded      ( ) Critical    Recommendations:  (x ) None     ( ) Other:

## 2020-01-15 LAB
ANION GAP SERPL CALC-SCNC: 9 MMOL/L — SIGNIFICANT CHANGE UP (ref 5–17)
BUN SERPL-MCNC: 23 MG/DL — SIGNIFICANT CHANGE UP (ref 7–23)
CALCIUM SERPL-MCNC: 8.5 MG/DL — SIGNIFICANT CHANGE UP (ref 8.5–10.1)
CHLORIDE SERPL-SCNC: 105 MMOL/L — SIGNIFICANT CHANGE UP (ref 96–108)
CO2 SERPL-SCNC: 26 MMOL/L — SIGNIFICANT CHANGE UP (ref 22–31)
CREAT SERPL-MCNC: 1 MG/DL — SIGNIFICANT CHANGE UP (ref 0.5–1.3)
GLUCOSE SERPL-MCNC: 107 MG/DL — HIGH (ref 70–99)
HCT VFR BLD CALC: 27 % — LOW (ref 39–50)
HCT VFR BLD CALC: 28.4 % — LOW (ref 39–50)
HGB BLD-MCNC: 9.2 G/DL — LOW (ref 13–17)
HGB BLD-MCNC: 9.4 G/DL — LOW (ref 13–17)
MCHC RBC-ENTMCNC: 30.5 PG — SIGNIFICANT CHANGE UP (ref 27–34)
MCHC RBC-ENTMCNC: 31 PG — SIGNIFICANT CHANGE UP (ref 27–34)
MCHC RBC-ENTMCNC: 33.1 GM/DL — SIGNIFICANT CHANGE UP (ref 32–36)
MCHC RBC-ENTMCNC: 34.1 GM/DL — SIGNIFICANT CHANGE UP (ref 32–36)
MCV RBC AUTO: 90.9 FL — SIGNIFICANT CHANGE UP (ref 80–100)
MCV RBC AUTO: 92.2 FL — SIGNIFICANT CHANGE UP (ref 80–100)
NRBC # BLD: 0 /100 WBCS — SIGNIFICANT CHANGE UP (ref 0–0)
NRBC # BLD: 0 /100 WBCS — SIGNIFICANT CHANGE UP (ref 0–0)
PLATELET # BLD AUTO: 155 K/UL — SIGNIFICANT CHANGE UP (ref 150–400)
PLATELET # BLD AUTO: 167 K/UL — SIGNIFICANT CHANGE UP (ref 150–400)
POTASSIUM SERPL-MCNC: 4.1 MMOL/L — SIGNIFICANT CHANGE UP (ref 3.5–5.3)
POTASSIUM SERPL-SCNC: 4.1 MMOL/L — SIGNIFICANT CHANGE UP (ref 3.5–5.3)
RBC # BLD: 2.97 M/UL — LOW (ref 4.2–5.8)
RBC # BLD: 3.08 M/UL — LOW (ref 4.2–5.8)
RBC # FLD: 14.1 % — SIGNIFICANT CHANGE UP (ref 10.3–14.5)
RBC # FLD: 14.3 % — SIGNIFICANT CHANGE UP (ref 10.3–14.5)
SODIUM SERPL-SCNC: 140 MMOL/L — SIGNIFICANT CHANGE UP (ref 135–145)
SURGICAL PATHOLOGY STUDY: SIGNIFICANT CHANGE UP
WBC # BLD: 11.86 K/UL — HIGH (ref 3.8–10.5)
WBC # BLD: 13.01 K/UL — HIGH (ref 3.8–10.5)
WBC # FLD AUTO: 11.86 K/UL — HIGH (ref 3.8–10.5)
WBC # FLD AUTO: 13.01 K/UL — HIGH (ref 3.8–10.5)

## 2020-01-15 PROCEDURE — 85730 THROMBOPLASTIN TIME PARTIAL: CPT

## 2020-01-15 PROCEDURE — 83036 HEMOGLOBIN GLYCOSYLATED A1C: CPT

## 2020-01-15 PROCEDURE — 99232 SBSQ HOSP IP/OBS MODERATE 35: CPT

## 2020-01-15 PROCEDURE — 87640 STAPH A DNA AMP PROBE: CPT

## 2020-01-15 PROCEDURE — 86900 BLOOD TYPING SEROLOGIC ABO: CPT

## 2020-01-15 PROCEDURE — 36415 COLL VENOUS BLD VENIPUNCTURE: CPT

## 2020-01-15 PROCEDURE — 73560 X-RAY EXAM OF KNEE 1 OR 2: CPT

## 2020-01-15 PROCEDURE — 71046 X-RAY EXAM CHEST 2 VIEWS: CPT

## 2020-01-15 PROCEDURE — 80053 COMPREHEN METABOLIC PANEL: CPT

## 2020-01-15 PROCEDURE — 86901 BLOOD TYPING SEROLOGIC RH(D): CPT

## 2020-01-15 PROCEDURE — 93005 ELECTROCARDIOGRAM TRACING: CPT

## 2020-01-15 PROCEDURE — 87641 MR-STAPH DNA AMP PROBE: CPT

## 2020-01-15 PROCEDURE — 86850 RBC ANTIBODY SCREEN: CPT

## 2020-01-15 PROCEDURE — 85027 COMPLETE CBC AUTOMATED: CPT

## 2020-01-15 PROCEDURE — 85610 PROTHROMBIN TIME: CPT

## 2020-01-15 PROCEDURE — G0463: CPT

## 2020-01-15 PROCEDURE — 86923 COMPATIBILITY TEST ELECTRIC: CPT

## 2020-01-15 RX ORDER — SODIUM CHLORIDE 9 MG/ML
500 INJECTION INTRAMUSCULAR; INTRAVENOUS; SUBCUTANEOUS ONCE
Refills: 0 | Status: COMPLETED | OUTPATIENT
Start: 2020-01-15 | End: 2020-01-15

## 2020-01-15 RX ORDER — MAGNESIUM HYDROXIDE 400 MG/1
30 TABLET, CHEWABLE ORAL ONCE
Refills: 0 | Status: COMPLETED | OUTPATIENT
Start: 2020-01-15 | End: 2020-01-16

## 2020-01-15 RX ORDER — POLYETHYLENE GLYCOL 3350 17 G/17G
17 POWDER, FOR SOLUTION ORAL DAILY
Refills: 0 | Status: DISCONTINUED | OUTPATIENT
Start: 2020-01-15 | End: 2020-01-17

## 2020-01-15 RX ADMIN — SODIUM CHLORIDE 500 MILLILITER(S): 9 INJECTION INTRAMUSCULAR; INTRAVENOUS; SUBCUTANEOUS at 12:24

## 2020-01-15 RX ADMIN — TRAMADOL HYDROCHLORIDE 50 MILLIGRAM(S): 50 TABLET ORAL at 08:45

## 2020-01-15 RX ADMIN — Medication 325 MILLIGRAM(S): at 17:10

## 2020-01-15 RX ADMIN — Medication 500 MILLIGRAM(S): at 05:42

## 2020-01-15 RX ADMIN — Medication 500 MILLIGRAM(S): at 17:10

## 2020-01-15 RX ADMIN — TRAMADOL HYDROCHLORIDE 50 MILLIGRAM(S): 50 TABLET ORAL at 20:30

## 2020-01-15 RX ADMIN — TRAMADOL HYDROCHLORIDE 50 MILLIGRAM(S): 50 TABLET ORAL at 16:20

## 2020-01-15 RX ADMIN — SENNA PLUS 2 TABLET(S): 8.6 TABLET ORAL at 08:21

## 2020-01-15 RX ADMIN — Medication 1 TABLET(S): at 11:47

## 2020-01-15 RX ADMIN — PANTOPRAZOLE SODIUM 40 MILLIGRAM(S): 20 TABLET, DELAYED RELEASE ORAL at 05:42

## 2020-01-15 RX ADMIN — TRAMADOL HYDROCHLORIDE 50 MILLIGRAM(S): 50 TABLET ORAL at 08:19

## 2020-01-15 RX ADMIN — Medication 325 MILLIGRAM(S): at 11:47

## 2020-01-15 RX ADMIN — Medication 1 MILLIGRAM(S): at 11:47

## 2020-01-15 RX ADMIN — TRAMADOL HYDROCHLORIDE 50 MILLIGRAM(S): 50 TABLET ORAL at 15:50

## 2020-01-15 RX ADMIN — Medication 325 MILLIGRAM(S): at 08:19

## 2020-01-15 RX ADMIN — TRAMADOL HYDROCHLORIDE 50 MILLIGRAM(S): 50 TABLET ORAL at 19:56

## 2020-01-15 RX ADMIN — RIVAROXABAN 10 MILLIGRAM(S): KIT at 11:47

## 2020-01-15 NOTE — PROGRESS NOTE ADULT - SUBJECTIVE AND OBJECTIVE BOX
Orthopedics      Patient seen and examined at bedside. Feeling well. Pain controlled. No n/v. No acute events overnight.    Vital Signs Last 24 Hrs  T(C): 36.9 (01-15-20 @ 12:15), Max: 37.6 (01-14-20 @ 20:05)  T(F): 98.5 (01-15-20 @ 12:15), Max: 99.6 (01-14-20 @ 20:05)  HR: 85 (01-15-20 @ 12:15) (62 - 87)  BP: 120/67 (01-15-20 @ 12:15) (91/63 - 157/70)  BP(mean): --  RR: 18 (01-15-20 @ 12:15) (18 - 18)  SpO2: 95% (01-15-20 @ 12:15) (94% - 99%)                        9.4    11.86 )-----------( 155      ( 15 Yosef 2020 12:37 )             28.4     15 Yosef 2020 12:37    140    |  105    |  23     ----------------------------<  107    4.1     |  26     |  1.00     Ca    8.5        15 Yosef 2020 12:37          Exam:  Gen: NAD, resting comfortably  LLE:  Dressing c/d/i  NTTP calves b/l  +EHL/FHL/TA/GS  SILT L2-S1  Compartments soft and compressible  2+ Pulses palpable      A/P: 89yM POD 2 s/p L TKA  -FU AM labs  -Pain control  -WBAT LLE  -DVT ppx  -Incentive Spirometry  -Elevation to extremity  -Medical management appreciated

## 2020-01-15 NOTE — PROGRESS NOTE ADULT - SUBJECTIVE AND OBJECTIVE BOX
Patient is a 89y old  Male who presents with a chief complaint of LEFT KNEE END STAGE OSTEOARTHRITIS  LEFT TOTAL KNEE REPLACEMENT (13 Jan 2020 19:53)  feels well, without complaints presently    INTERVAL HPI/OVERNIGHT EVENTS:  T(C): 36.8 (01-15-20 @ 07:42), Max: 37.6 (01-14-20 @ 20:05)  HR: 62 (01-15-20 @ 11:55) (62 - 87)  BP: 145/83 (01-15-20 @ 11:55) (91/63 - 157/70)  RR: 18 (01-15-20 @ 07:42) (18 - 18)  SpO2: 97% (01-15-20 @ 11:55) (94% - 99%)  Wt(kg): --  I&O's Summary    14 Jan 2020 07:01  -  15 Yosef 2020 07:00  --------------------------------------------------------  IN: 0 mL / OUT: 1150 mL / NET: -1150 mL    15 Yosef 2020 07:01  -  15 Yosef 2020 12:03  --------------------------------------------------------  IN: 360 mL / OUT: 400 mL / NET: -40 mL        LABS:                        9.2    13.01 )-----------( 167      ( 15 Yosef 2020 06:57 )             27.0     01-14    139  |  105  |  27<H>  ----------------------------<  119<H>  4.5   |  24  |  1.40<H>    Ca    7.7<L>      14 Jan 2020 06:03          CAPILLARY BLOOD GLUCOSE                MEDICATIONS  (STANDING):  ascorbic acid 500 milliGRAM(s) Oral two times a day  ferrous    sulfate 325 milliGRAM(s) Oral three times a day with meals  folic acid 1 milliGRAM(s) Oral daily  isosorbide   mononitrate ER Tablet (IMDUR) 30 milliGRAM(s) Oral daily  multivitamin 1 Tablet(s) Oral daily  pantoprazole    Tablet 40 milliGRAM(s) Oral before breakfast  rivaroxaban 10 milliGRAM(s) Oral daily  sodium chloride 0.9% Bolus 500 milliLiter(s) IV Bolus once    MEDICATIONS  (PRN):  acetaminophen   Tablet .. 1000 milliGRAM(s) Oral every 6 hours PRN Mild Pain (1 - 3)  ondansetron Injectable 4 milliGRAM(s) IV Push every 6 hours PRN Nausea and/or Vomiting  senna 2 Tablet(s) Oral at bedtime PRN Constipation  traMADol 50 milliGRAM(s) Oral every 4 hours PRN MODERATE TO SEVERE PAIN      REVIEW OF SYSTEMS:  CONSTITUTIONAL: No fever, weight loss, or fatigue  EYES: No eye pain, visual disturbances, or discharge  ENMT:  No difficulty hearing, tinnitus, vertigo; No sinus or throat pain  NECK: No pain or stiffness  RESPIRATORY: No cough, wheezing, chills or hemoptysis; No shortness of breath  CARDIOVASCULAR: No chest pain, palpitations, dizziness, or leg swelling  GASTROINTESTINAL: No abdominal or epigastric pain. No nausea, vomiting, or hematemesis; No diarrhea or constipation. No melena or hematochezia.  GENITOURINARY: No dysuria, frequency, hematuria, or incontinence  NEUROLOGICAL: No headaches, memory loss, loss of strength, numbness, or tremors  SKIN: No itching, burning, rashes, or lesions   LYMPH NODES: No enlarged glands  ENDOCRINE: No heat or cold intolerance; No hair loss  MUSCULOSKELETAL:chronic left knee pain  PSYCHIATRIC: No depression, anxiety, mood swings, or difficulty sleeping  HEME/LYMPH: No easy bruising, or bleeding gums  ALLERY AND IMMUNOLOGIC: No hives or eczema    RADIOLOGY & ADDITIONAL TESTS:    Imaging Personally Reviewed:  [ ] YES  [ ] NO    Consultant(s) Notes Reviewed:  [ ] YES  [ ] NO    PHYSICAL EXAM:  GENERAL: NAD, well-groomed, well-developed  HEAD:  Atraumatic, Normocephalic  EYES: EOMI, PERRLA, conjunctiva and sclera clear  ENMT: No tonsillar erythema, exudates, or enlargement; Moist mucous membranes, Good dentition, No lesions  NECK: Supple, No JVD, Normal thyroid  NERVOUS SYSTEM:  Alert & Oriented X3, Good concentration; Motor Strength 5/5 B/L upper and lower extremities; DTRs 2+ intact and symmetric  CHEST/LUNG: Clear to percussion bilaterally; No rales, rhonchi, wheezing, or rubs  HEART: Regular rate and rhythm; No murmurs, rubs, or gallops  ABDOMEN: Soft, Nontender, Nondistended; Bowel sounds present  EXTREMITIES:  2+ Peripheral Pulses, No clubbing, cyanosis, or edema  LYMPH: No lymphadenopathy noted  SKIN: No rashes or lesions    Care Discussed with Consultants/Other Providers [ ] YES  [ ] NO

## 2020-01-15 NOTE — CHART NOTE - NSCHARTNOTEFT_GEN_A_CORE
STATUS POST:  L TKR    POST OPERATIVE DAY #: 2    SUBJECTIVE:  Called by nurse for pt having orthostatic hypotension. Pressure in 150s systolic supine, 90s systolic standing. Pt admits to dizziness, blurred vision and confusion upon standing. Pt also with complaint of constipation. Pt received 500cc bolus earlier in day with relief of sx. Denies fevers, nausea/vomiting/diarrhea.    Vital Signs Last 24 Hrs  T(C): 37.1 (15 Yosef 2020 18:30), Max: 37.6 (14 Jan 2020 20:05)  T(F): 98.8 (15 Yosef 2020 18:30), Max: 99.6 (14 Jan 2020 20:05)  HR: 83 (15 Yosef 2020 18:30) (62 - 87)  BP: 121/75 (15 Yosef 2020 18:30) (91/63 - 157/70)  BP(mean): --  RR: 16 (15 Yosef 2020 18:30) (16 - 18)  SpO2: 97% (15 Yosef 2020 18:30) (94% - 99%)    PHYSICAL EXAM:  GENERAL: No acute distress, well-developed  HEAD:  Atraumatic, Normocephalic  L KNEE: mildly swollen, soft without signs of compartment syndrome or pooling of blood. dressing c/d/i without saturation   NEUROLOGY: A&O x 3, no focal deficits    I&O's Summary    14 Jan 2020 07:01  -  15 Yosef 2020 07:00  --------------------------------------------------------  IN: 0 mL / OUT: 1150 mL / NET: -1150 mL    15 Yosef 2020 07:01  -  15 Yosef 2020 18:47  --------------------------------------------------------  IN: 360 mL / OUT: 400 mL / NET: -40 mL      I&O's Detail    14 Jan 2020 07:01  -  15 Yosef 2020 07:00  --------------------------------------------------------  IN:  Total IN: 0 mL    OUT:    Voided: 1150 mL  Total OUT: 1150 mL    Total NET: -1150 mL      15 Yosef 2020 07:01  -  15 Yosef 2020 18:47  --------------------------------------------------------  IN:    Oral Fluid: 360 mL  Total IN: 360 mL    OUT:    Voided: 400 mL  Total OUT: 400 mL    Total NET: -40 mL        MEDICATIONS  (STANDING):  ascorbic acid 500 milliGRAM(s) Oral two times a day  ferrous    sulfate 325 milliGRAM(s) Oral three times a day with meals  folic acid 1 milliGRAM(s) Oral daily  isosorbide   mononitrate ER Tablet (IMDUR) 30 milliGRAM(s) Oral daily  multivitamin 1 Tablet(s) Oral daily  pantoprazole    Tablet 40 milliGRAM(s) Oral before breakfast  rivaroxaban 10 milliGRAM(s) Oral daily    MEDICATIONS  (PRN):  acetaminophen   Tablet .. 1000 milliGRAM(s) Oral every 6 hours PRN Mild Pain (1 - 3)  ondansetron Injectable 4 milliGRAM(s) IV Push every 6 hours PRN Nausea and/or Vomiting  senna 2 Tablet(s) Oral at bedtime PRN Constipation  traMADol 50 milliGRAM(s) Oral every 4 hours PRN MODERATE TO SEVERE PAIN    LABS:                        9.4    11.86 )-----------( 155      ( 15 Yosef 2020 12:37 )             28.4     01-15    140  |  105  |  23  ----------------------------<  107<H>  4.1   |  26  |  1.00    Ca    8.5      15 Yosef 2020 12:37    RADIOLOGY & ADDITIONAL STUDIES:    ASSESSMENT    89y Male s/p L TKR currently with symptomatic orthostatic hypotension.    PLAN  - Discussed earlier in day would transfuse with PRBC if pt becomes symptomatic again  - 1 unit PRBC ordered, hold bolus at this time  - encouraged to limit tramadol intake if possible as they could be aiding sx  - continue icing knee to reduce pain/swelling  - 1x dose of MOM ordered for constipation    Surgical Team Contact Information  Spectralink: Ext: [phone] or [phone]  Pager: [phone]

## 2020-01-16 DIAGNOSIS — I95.1 ORTHOSTATIC HYPOTENSION: ICD-10-CM

## 2020-01-16 LAB
HCT VFR BLD CALC: 30 % — LOW (ref 39–50)
HGB BLD-MCNC: 9.9 G/DL — LOW (ref 13–17)
MCHC RBC-ENTMCNC: 30.5 PG — SIGNIFICANT CHANGE UP (ref 27–34)
MCHC RBC-ENTMCNC: 33 GM/DL — SIGNIFICANT CHANGE UP (ref 32–36)
MCV RBC AUTO: 92.3 FL — SIGNIFICANT CHANGE UP (ref 80–100)
NRBC # BLD: 0 /100 WBCS — SIGNIFICANT CHANGE UP (ref 0–0)
PLATELET # BLD AUTO: 142 K/UL — LOW (ref 150–400)
RBC # BLD: 3.25 M/UL — LOW (ref 4.2–5.8)
RBC # FLD: 13.8 % — SIGNIFICANT CHANGE UP (ref 10.3–14.5)
WBC # BLD: 8.8 K/UL — SIGNIFICANT CHANGE UP (ref 3.8–10.5)
WBC # FLD AUTO: 8.8 K/UL — SIGNIFICANT CHANGE UP (ref 3.8–10.5)

## 2020-01-16 RX ADMIN — POLYETHYLENE GLYCOL 3350 17 GRAM(S): 17 POWDER, FOR SOLUTION ORAL at 11:37

## 2020-01-16 RX ADMIN — Medication 1000 MILLIGRAM(S): at 11:43

## 2020-01-16 RX ADMIN — PANTOPRAZOLE SODIUM 40 MILLIGRAM(S): 20 TABLET, DELAYED RELEASE ORAL at 05:11

## 2020-01-16 RX ADMIN — Medication 325 MILLIGRAM(S): at 11:37

## 2020-01-16 RX ADMIN — MAGNESIUM HYDROXIDE 30 MILLILITER(S): 400 TABLET, CHEWABLE ORAL at 05:11

## 2020-01-16 RX ADMIN — Medication 1 TABLET(S): at 11:37

## 2020-01-16 RX ADMIN — Medication 1 MILLIGRAM(S): at 11:37

## 2020-01-16 RX ADMIN — Medication 500 MILLIGRAM(S): at 17:03

## 2020-01-16 RX ADMIN — Medication 325 MILLIGRAM(S): at 17:03

## 2020-01-16 RX ADMIN — Medication 325 MILLIGRAM(S): at 07:40

## 2020-01-16 RX ADMIN — Medication 1000 MILLIGRAM(S): at 12:15

## 2020-01-16 RX ADMIN — SENNA PLUS 2 TABLET(S): 8.6 TABLET ORAL at 07:40

## 2020-01-16 RX ADMIN — Medication 500 MILLIGRAM(S): at 05:11

## 2020-01-16 RX ADMIN — RIVAROXABAN 10 MILLIGRAM(S): KIT at 11:37

## 2020-01-16 NOTE — PROGRESS NOTE ADULT - SUBJECTIVE AND OBJECTIVE BOX
Patient is a 89y old  Male who presents with a chief complaint of LEFT KNEE END STAGE OSTEOARTHRITIS  LEFT TOTAL KNEE REPLACEMENT (13 Jan 2020 19:53)  orthostatic hypotension noted  feels well otherwise      INTERVAL HPI/OVERNIGHT EVENTS:  T(C): 37.1 (01-16-20 @ 11:41), Max: 37.1 (01-15-20 @ 18:30)  HR: 95 (01-16-20 @ 11:46) (63 - 95)  BP: 144/88 (01-16-20 @ 11:46) (106/82 - 145/84)  RR: 17 (01-16-20 @ 11:41) (16 - 18)  SpO2: 95% (01-16-20 @ 11:46) (95% - 98%)  Wt(kg): --  I&O's Summary    15 Yosef 2020 07:01  -  16 Jan 2020 07:00  --------------------------------------------------------  IN: 360 mL / OUT: 400 mL / NET: -40 mL        LABS:                        9.9    8.80  )-----------( 142      ( 16 Jan 2020 07:07 )             30.0     01-15    140  |  105  |  23  ----------------------------<  107<H>  4.1   |  26  |  1.00    Ca    8.5      15 Yosef 2020 12:37          CAPILLARY BLOOD GLUCOSE                MEDICATIONS  (STANDING):  ascorbic acid 500 milliGRAM(s) Oral two times a day  ferrous    sulfate 325 milliGRAM(s) Oral three times a day with meals  folic acid 1 milliGRAM(s) Oral daily  multivitamin 1 Tablet(s) Oral daily  pantoprazole    Tablet 40 milliGRAM(s) Oral before breakfast  polyethylene glycol 3350 17 Gram(s) Oral daily  rivaroxaban 10 milliGRAM(s) Oral daily    MEDICATIONS  (PRN):  ondansetron Injectable 4 milliGRAM(s) IV Push every 6 hours PRN Nausea and/or Vomiting  senna 2 Tablet(s) Oral at bedtime PRN Constipation  traMADol 50 milliGRAM(s) Oral every 4 hours PRN MODERATE TO SEVERE PAIN      REVIEW OF SYSTEMS:  CONSTITUTIONAL: No fever, weight loss, or fatigue  EYES: No eye pain, visual disturbances, or discharge  ENMT:  No difficulty hearing, tinnitus, vertigo; No sinus or throat pain  NECK: No pain or stiffness  RESPIRATORY: No cough, wheezing, chills or hemoptysis; No shortness of breath  CARDIOVASCULAR: No chest pain, palpitations, dizziness, or leg swelling  GASTROINTESTINAL: No abdominal or epigastric pain. No nausea, vomiting, or hematemesis; No diarrhea or constipation. No melena or hematochezia.  GENITOURINARY: No dysuria, frequency, hematuria, or incontinence  NEUROLOGICAL: No headaches, memory loss, loss of strength, numbness, or tremors  SKIN: No itching, burning, rashes, or lesions   LYMPH NODES: No enlarged glands  ENDOCRINE: No heat or cold intolerance; No hair loss  MUSCULOSKELETAL: No joint pain or swelling; No muscle, back, or extremity pain  PSYCHIATRIC: No depression, anxiety, mood swings, or difficulty sleeping  HEME/LYMPH: No easy bruising, or bleeding gums  ALLERY AND IMMUNOLOGIC: No hives or eczema    RADIOLOGY & ADDITIONAL TESTS:    Imaging Personally Reviewed:  [ ] YES  [ ] NO    Consultant(s) Notes Reviewed:  [ ] YES  [ ] NO    PHYSICAL EXAM:  GENERAL: NAD, well-groomed, well-developed  HEAD:  Atraumatic, Normocephalic  EYES: EOMI, PERRLA, conjunctiva and sclera clear  ENMT: No tonsillar erythema, exudates, or enlargement; Moist mucous membranes, Good dentition, No lesions  NECK: Supple, No JVD, Normal thyroid  NERVOUS SYSTEM:  Alert & Oriented X3, Good concentration; Motor Strength 5/5 B/L upper and lower extremities; DTRs 2+ intact and symmetric  CHEST/LUNG: Clear to percussion bilaterally; No rales, rhonchi, wheezing, or rubs  HEART: Regular rate and rhythm; No murmurs, rubs, or gallops  ABDOMEN: Soft, Nontender, Nondistended; Bowel sounds present  EXTREMITIES:  2+ Peripheral Pulses, No clubbing, cyanosis, or edema  LYMPH: No lymphadenopathy noted  SKIN: No rashes or lesions    Care Discussed with Consultants/Other Providers [ ] YES  [ ] NO

## 2020-01-16 NOTE — PROGRESS NOTE ADULT - SUBJECTIVE AND OBJECTIVE BOX
TEODOROGILMAR      89y   male  MRN-[id number]    ORTHOPEDIC SURGERY / DR. BRITO    POD # 3    Vital Signs Last 24 Hrs  T(C): 36.8 (15 Yosef 2020 23:05), Max: 37.1 (15 Yosef 2020 18:30)  T(F): 98.3 (15 Yosef 2020 23:05), Max: 98.8 (15 Yosef 2020 18:30)  HR: 63 (15 Yosef 2020 23:05) (62 - 86)  BP: 120/73 (15 Yosef 2020 23:05) (91/63 - 157/70)  BP(mean): --  RR: 18 (15 Yosef 2020 23:05) (16 - 18)  SpO2: 96% (15 Yosef 2020 23:05) (95% - 99%)    LEFT KNEE :    WOUND DRY AND INTACT  SOME EDEMA WITH ECCHYMOSIS  GOOD MOTOR TO LEFT LOWER EXTREMITY  NEURO-VASCULAR STATUS INTACT  NO CALF TENDERNESS    Hemoglobin: 9.4 (01-15 @ 12:37)  Hemoglobin: 9.2 (01-15 @ 06:57)  Hemoglobin: 8.3 (01-14 @ 06:03)  Hemoglobin: 9.8 (01-13 @ 19:25)  Hemoglobin: 11.2 (01-13 @ 14:22)    Hematocrit: 28.4 (01-15 @ 12:37)  Hematocrit: 27.0 (01-15 @ 06:57)  Hematocrit: 25.5 (01-14 @ 06:03)  Hematocrit: 30.7 (01-13 @ 19:25)  Hematocrit: 34.8 (01-13 @ 14:22)    01-15    140  |  105  |  23  ----------------------------<  107<H>  4.1   |  26  |  1.00    Ca    8.5      15 Yosef 2020 12:37                             ASSESSMENT &  PLAN:      POD # 3 S/P LEFT TOTAL KNEE REPLACEMENT  ANEMIA POST OP ACUTE BLOOD LOSS WITH HYPOTENSION     WEIGHT  BEARING AS TOLERATED, OOB AND AMBULATE, PHYSICAL THERAPY   DVT PROPHYLAXIS XARELTO 10 MG DAILY   S/P ONE UNIT PRBC   DISCHARGE PLANNING TO  REHAB TODAY PENDING CBC RESULTS   NEW AQUACEL DRESSING APPLIED

## 2020-01-16 NOTE — PROGRESS NOTE ADULT - SUBJECTIVE AND OBJECTIVE BOX
GILMAR VALERIO      89y   male  MRN-[id number]    ORTHOPEDIC SURGERY FOLLOW UP/ DR. BRITO    PATIENT EXPERIENCED SOME DIZZINESS AGAIN WITH PT, AND AMBULATION. NOTED TO HAVE ORTHOSTATIC HYPOTENSION   SEEN BY DR. PERLMAN ADVISED HOLD DISCHARGE FOR NOW   WILL D/C TO REHAB ONCE MEDICALLY STABLE  AND CLEAR

## 2020-01-17 ENCOUNTER — TRANSCRIPTION ENCOUNTER (OUTPATIENT)
Age: 85
End: 2020-01-17

## 2020-01-17 VITALS
TEMPERATURE: 98 F | OXYGEN SATURATION: 99 % | RESPIRATION RATE: 17 BRPM | DIASTOLIC BLOOD PRESSURE: 76 MMHG | HEART RATE: 96 BPM | SYSTOLIC BLOOD PRESSURE: 119 MMHG

## 2020-01-17 PROCEDURE — C1713: CPT

## 2020-01-17 PROCEDURE — 85027 COMPLETE CBC AUTOMATED: CPT

## 2020-01-17 PROCEDURE — 80048 BASIC METABOLIC PNL TOTAL CA: CPT

## 2020-01-17 PROCEDURE — 88305 TISSUE EXAM BY PATHOLOGIST: CPT

## 2020-01-17 PROCEDURE — 86923 COMPATIBILITY TEST ELECTRIC: CPT

## 2020-01-17 PROCEDURE — 97165 OT EVAL LOW COMPLEX 30 MIN: CPT

## 2020-01-17 PROCEDURE — 85018 HEMOGLOBIN: CPT

## 2020-01-17 PROCEDURE — 85014 HEMATOCRIT: CPT

## 2020-01-17 PROCEDURE — 36430 TRANSFUSION BLD/BLD COMPNT: CPT

## 2020-01-17 PROCEDURE — 97116 GAIT TRAINING THERAPY: CPT

## 2020-01-17 PROCEDURE — 97162 PT EVAL MOD COMPLEX 30 MIN: CPT

## 2020-01-17 PROCEDURE — 97530 THERAPEUTIC ACTIVITIES: CPT

## 2020-01-17 PROCEDURE — 36415 COLL VENOUS BLD VENIPUNCTURE: CPT

## 2020-01-17 PROCEDURE — 97535 SELF CARE MNGMENT TRAINING: CPT

## 2020-01-17 PROCEDURE — 88311 DECALCIFY TISSUE: CPT

## 2020-01-17 PROCEDURE — 82962 GLUCOSE BLOOD TEST: CPT

## 2020-01-17 PROCEDURE — C1776: CPT

## 2020-01-17 PROCEDURE — P9016: CPT

## 2020-01-17 PROCEDURE — 73562 X-RAY EXAM OF KNEE 3: CPT

## 2020-01-17 RX ADMIN — Medication 500 MILLIGRAM(S): at 17:27

## 2020-01-17 RX ADMIN — Medication 1 MILLIGRAM(S): at 12:39

## 2020-01-17 RX ADMIN — Medication 1 TABLET(S): at 12:39

## 2020-01-17 RX ADMIN — Medication 325 MILLIGRAM(S): at 08:19

## 2020-01-17 RX ADMIN — Medication 325 MILLIGRAM(S): at 17:27

## 2020-01-17 RX ADMIN — Medication 500 MILLIGRAM(S): at 05:07

## 2020-01-17 RX ADMIN — RIVAROXABAN 10 MILLIGRAM(S): KIT at 12:39

## 2020-01-17 RX ADMIN — TRAMADOL HYDROCHLORIDE 50 MILLIGRAM(S): 50 TABLET ORAL at 12:48

## 2020-01-17 RX ADMIN — PANTOPRAZOLE SODIUM 40 MILLIGRAM(S): 20 TABLET, DELAYED RELEASE ORAL at 05:08

## 2020-01-17 RX ADMIN — Medication 325 MILLIGRAM(S): at 12:39

## 2020-01-17 NOTE — PROGRESS NOTE ADULT - ATTENDING COMMENTS
pt seen and xamined   with the daughter at the bedside.  pt is ambulating in the halll using the rolling walker denies any dizzyness and any palpatation  blood pressure well cntrolled  pt is ok to go to rehab

## 2020-01-17 NOTE — DISCHARGE NOTE NURSING/CASE MANAGEMENT/SOCIAL WORK - PATIENT PORTAL LINK FT
You can access the FollowMyHealth Patient Portal offered by [facility] by registering at the following website: [email or web address]. By joining [facility]’s FollowMyHealth portal, you will also be able to view your health information using other applications (apps) compatible with our system.

## 2020-01-17 NOTE — PROGRESS NOTE ADULT - SUBJECTIVE AND OBJECTIVE BOX
Orthopedics      Patient seen and examined at bedside. Feeling well. Pain controlled. No n/v. No acute events overnight.    Vital Signs Last 24 Hrs  T(C): 36.6 (01-17-20 @ 04:31), Max: 37.2 (01-16-20 @ 16:41)  T(F): 97.8 (01-17-20 @ 04:31), Max: 98.9 (01-16-20 @ 16:41)  HR: 68 (01-17-20 @ 04:31) (65 - 95)  BP: 147/79 (01-17-20 @ 04:31) (106/82 - 147/94)  BP(mean): --  RR: 17 (01-17-20 @ 04:31) (17 - 18)  SpO2: 98% (01-17-20 @ 04:31) (95% - 98%)                        9.9    8.80  )-----------( 142      ( 16 Jan 2020 07:07 )             30.0     15 Yosef 2020 12:37    140    |  105    |  23     ----------------------------<  107    4.1     |  26     |  1.00     Ca    8.5        15 Yosef 2020 12:37          Exam:  Gen: NAD, resting comfortably  LLE:  Dressing c/d/i  NTTP calves b/l  +EHL/FHL/TA/GS  SILT L2-S1  Compartments soft and compressible  2+ Pulses palpable      A/P: 89yM POD 4 s/p L TKA  -FU AM labs  -Pain control  -WBAT LLE  -DVT ppx  -Incentive Spirometry  -Elevation to extremity  -Medical management appreciated  -SOPHIA today

## 2020-01-17 NOTE — PROGRESS NOTE ADULT - SUBJECTIVE AND OBJECTIVE BOX
Patient is a 89y old  Male who presents with a chief complaint of LEFT KNEE END STAGE OSTEOARTHRITIS  LEFT TOTAL KNEE REPLACEMENT (13 Jan 2020 19:53)      INTERVAL HPI/OVERNIGHT EVENTS:  T(C): 36.7 (01-17-20 @ 15:36), Max: 36.8 (01-17-20 @ 07:42)  HR: 96 (01-17-20 @ 15:36) (68 - 96)  BP: 119/76 (01-17-20 @ 15:36) (119/76 - 161/90)  RR: 17 (01-17-20 @ 15:36) (17 - 17)  SpO2: 99% (01-17-20 @ 15:36) (98% - 99%)  Wt(kg): --  I&O's Summary    16 Jan 2020 07:01  -  17 Jan 2020 07:00  --------------------------------------------------------  IN: 360 mL / OUT: 1200 mL / NET: -840 mL    17 Jan 2020 07:01  -  17 Jan 2020 17:07  --------------------------------------------------------  IN: 0 mL / OUT: 525 mL / NET: -525 mL        PAST MEDICAL & SURGICAL HISTORY:  Dry eyes, bilateral  H/O urethral stricture  Muscle cramps: Notes Mostly in Toes  Low back pain  Heartburn  Shortness of breath  Heart murmur  Unilateral primary osteoarthritis, left knee  H/O endoscopy  H/O colonoscopy  S/P release of urethral stricture      SOCIAL HISTORY  Alcohol:  Tobacco:  Illicit substance use:      FAMILY HISTORY:      LABS:                        9.9    8.80  )-----------( 142      ( 16 Jan 2020 07:07 )             30.0               CAPILLARY BLOOD GLUCOSE                MEDICATIONS  (STANDING):  ascorbic acid 500 milliGRAM(s) Oral two times a day  ferrous    sulfate 325 milliGRAM(s) Oral three times a day with meals  folic acid 1 milliGRAM(s) Oral daily  multivitamin 1 Tablet(s) Oral daily  pantoprazole    Tablet 40 milliGRAM(s) Oral before breakfast  polyethylene glycol 3350 17 Gram(s) Oral daily  rivaroxaban 10 milliGRAM(s) Oral daily    MEDICATIONS  (PRN):  ondansetron Injectable 4 milliGRAM(s) IV Push every 6 hours PRN Nausea and/or Vomiting  senna 2 Tablet(s) Oral at bedtime PRN Constipation  traMADol 50 milliGRAM(s) Oral every 4 hours PRN MODERATE TO SEVERE PAIN      REVIEW OF SYSTEMS:  CONSTITUTIONAL: No fever, weight loss, or fatigue  EYES: No eye pain, visual disturbances, or discharge  ENMT:  No difficulty hearing, tinnitus, vertigo; No sinus or throat pain  NECK: No pain or stiffness  RESPIRATORY: No cough, wheezing, chills or hemoptysis; No shortness of breath  CARDIOVASCULAR: No chest pain, palpitations, dizziness, or leg swelling  GASTROINTESTINAL: No abdominal or epigastric pain. No nausea, vomiting, or hematemesis; No diarrhea or constipation. No melena or hematochezia.  MUSCULOSKELETAL: No joint pain or swelling; No muscle, back, or extremity pain  LLERY AND IMMUNOLOGIC: No hives or eczema    RADIOLOGY & ADDITIONAL TESTS:    Imaging Personally Reviewed:  [ ] YES  [ ] NO    Consultant(s) Notes Reviewed:  [ ] YES  [ ] NO    PHYSICAL EXAM:  GENERAL: NAD, well-groomed, well-developed  NERVOUS SYSTEM:  Alert & Oriented X3, Good concentration; Motor Strength 5/5 B/L upper and lower extremities; DTRs 2+ intact and symmetric  CHEST/LUNG: Clear to percussion bilaterally; No rales, rhonchi, wheezing, or rubs  HEART: Regular rate and rhythm; No murmurs, rubs, or gallops  ABDOMEN: Soft, Nontender, Nondistended; Bowel sounds present  EXTREMITIES:  2+ Peripheral Pulses, No clubbing, cyanosis, or edema  Care Discussed with Consultants/Other Providers [ ] YES  [ ] NO

## 2020-12-04 NOTE — CONSULT NOTE ADULT - PROBLEM SELECTOR RECOMMENDATION 9
Detail Level: Detailed Was A Bandage Applied: Yes Punch Size In Mm: 3 Biopsy Type: H and E Anesthesia Type: 1% lidocaine with epinephrine Anesthesia Volume In Cc: 0.5 Additional Anesthesia Volume In Cc (Will Not Render If 0): 0 per pt's cardiologist, pt on imdur not for chf or angina, but for prevention of progression of valvular heart ds Hemostasis: None Epidermal Sutures: 5-0 Vicryl Rapide Wound Care: Mupirocin Dressing: bandage Patient Will Remove Sutures At Home?: No Size Of Lesion In Cm (Optional): 2 X Size Of Lesion In Cm (Optional): 1.4 Lab: [id number] Lab Facility: [id number] Consent: Written consent was obtained and risks were reviewed including but not limited to scarring, infection, bleeding, scabbing, incomplete removal, nerve damage and allergy to anesthesia. Post-Care Instructions: I reviewed with the patient in detail post-care instructions. Patient is to keep the biopsy site dry overnight, and then apply bacitracin twice daily until healed. Patient may apply hydrogen peroxide soaks to remove any crusting. Home Suture Removal Text: Patient was provided a home suture removal kit and will remove their sutures at home.  If they have any questions or difficulties they will call the office. Notification Instructions: Patient will be notified of biopsy results. However, patient instructed to call the office if not contacted within 2 weeks. Billing Type: Third-Party Bill Information: Selecting Yes will display possible errors in your note based on the variables you have selected. This validation is only offered as a suggestion for you. PLEASE NOTE THAT THE VALIDATION TEXT WILL BE REMOVED WHEN YOU FINALIZE YOUR NOTE. IF YOU WANT TO FAX A PRELIMINARY NOTE YOU WILL NEED TO TOGGLE THIS TO 'NO' IF YOU DO NOT WANT IT IN YOUR FAXED NOTE.

## 2021-06-12 NOTE — ASU PREOP CHECKLIST - AS TEMP SITE
oral
You can access the FollowMyHealth Patient Portal offered by [facility] by registering at the following website: [email or web address]. By joining [employer]’s FollowMyHealth portal, you will also be able to view your health information using other applications (apps) compatible with our system.

## 2021-10-29 NOTE — H&P PST ADULT - FUNCTIONAL SCREEN CURRENT LEVEL: DRESSING, MLM
Sleep study in [place] calling stating they only have sleep study results and no office notes.  They are going to send the sleep study results.     2 = assistive person

## 2022-08-27 ENCOUNTER — EMERGENCY (EMERGENCY)
Facility: HOSPITAL | Age: 87
LOS: 1 days | Discharge: HOME | End: 2022-08-27
Attending: STUDENT IN AN ORGANIZED HEALTH CARE EDUCATION/TRAINING PROGRAM
Payer: MEDICARE

## 2022-08-27 VITALS
OXYGEN SATURATION: 98 % | HEART RATE: 61 BPM | DIASTOLIC BLOOD PRESSURE: 81 MMHG | SYSTOLIC BLOOD PRESSURE: 155 MMHG | TEMPERATURE: 98 F | RESPIRATION RATE: 18 BRPM

## 2022-08-27 VITALS
TEMPERATURE: 98 F | RESPIRATION RATE: 18 BRPM | SYSTOLIC BLOOD PRESSURE: 183 MMHG | DIASTOLIC BLOOD PRESSURE: 107 MMHG | HEIGHT: 70 IN | WEIGHT: 197.98 LBS | OXYGEN SATURATION: 97 % | HEART RATE: 67 BPM

## 2022-08-27 DIAGNOSIS — Z98.890 OTHER SPECIFIED POSTPROCEDURAL STATES: Chronic | ICD-10-CM

## 2022-08-27 PROBLEM — R12 HEARTBURN: Chronic | Status: ACTIVE | Noted: 2019-12-31

## 2022-08-27 PROBLEM — R06.02 SHORTNESS OF BREATH: Chronic | Status: ACTIVE | Noted: 2019-12-31

## 2022-08-27 PROBLEM — M17.12 UNILATERAL PRIMARY OSTEOARTHRITIS, LEFT KNEE: Chronic | Status: ACTIVE | Noted: 2019-12-31

## 2022-08-27 PROBLEM — H04.123 DRY EYE SYNDROME OF BILATERAL LACRIMAL GLANDS: Chronic | Status: ACTIVE | Noted: 2019-12-31

## 2022-08-27 PROBLEM — M54.5 LOW BACK PAIN: Chronic | Status: ACTIVE | Noted: 2019-12-31

## 2022-08-27 PROBLEM — R01.1 CARDIAC MURMUR, UNSPECIFIED: Chronic | Status: ACTIVE | Noted: 2019-12-31

## 2022-08-27 PROBLEM — Z87.448 PERSONAL HISTORY OF OTHER DISEASES OF URINARY SYSTEM: Chronic | Status: ACTIVE | Noted: 2019-12-31

## 2022-08-27 PROBLEM — R25.2 CRAMP AND SPASM: Chronic | Status: ACTIVE | Noted: 2019-12-31

## 2022-08-27 LAB
ALBUMIN SERPL ELPH-MCNC: 4.1 G/DL — SIGNIFICANT CHANGE UP (ref 3.3–5.2)
ALP SERPL-CCNC: 130 U/L — HIGH (ref 40–120)
ALT FLD-CCNC: 21 U/L — SIGNIFICANT CHANGE UP
ANION GAP SERPL CALC-SCNC: 12 MMOL/L — SIGNIFICANT CHANGE UP (ref 5–17)
AST SERPL-CCNC: 21 U/L — SIGNIFICANT CHANGE UP
BASOPHILS # BLD AUTO: 0.03 K/UL — SIGNIFICANT CHANGE UP (ref 0–0.2)
BASOPHILS NFR BLD AUTO: 0.3 % — SIGNIFICANT CHANGE UP (ref 0–2)
BILIRUB SERPL-MCNC: 0.6 MG/DL — SIGNIFICANT CHANGE UP (ref 0.4–2)
BUN SERPL-MCNC: 23.5 MG/DL — HIGH (ref 8–20)
CALCIUM SERPL-MCNC: 9 MG/DL — SIGNIFICANT CHANGE UP (ref 8.4–10.5)
CHLORIDE SERPL-SCNC: 104 MMOL/L — SIGNIFICANT CHANGE UP (ref 98–107)
CO2 SERPL-SCNC: 24 MMOL/L — SIGNIFICANT CHANGE UP (ref 22–29)
CREAT SERPL-MCNC: 1.31 MG/DL — HIGH (ref 0.5–1.3)
EGFR: 50 ML/MIN/1.73M2 — LOW
EOSINOPHIL # BLD AUTO: 0.07 K/UL — SIGNIFICANT CHANGE UP (ref 0–0.5)
EOSINOPHIL NFR BLD AUTO: 0.7 % — SIGNIFICANT CHANGE UP (ref 0–6)
GLUCOSE SERPL-MCNC: 105 MG/DL — HIGH (ref 70–99)
HCT VFR BLD CALC: 41.8 % — SIGNIFICANT CHANGE UP (ref 39–50)
HGB BLD-MCNC: 14 G/DL — SIGNIFICANT CHANGE UP (ref 13–17)
IMM GRANULOCYTES NFR BLD AUTO: 0.5 % — SIGNIFICANT CHANGE UP (ref 0–1.5)
LYMPHOCYTES # BLD AUTO: 1.56 K/UL — SIGNIFICANT CHANGE UP (ref 1–3.3)
LYMPHOCYTES # BLD AUTO: 15.4 % — SIGNIFICANT CHANGE UP (ref 13–44)
MCHC RBC-ENTMCNC: 32.3 PG — SIGNIFICANT CHANGE UP (ref 27–34)
MCHC RBC-ENTMCNC: 33.5 GM/DL — SIGNIFICANT CHANGE UP (ref 32–36)
MCV RBC AUTO: 96.3 FL — SIGNIFICANT CHANGE UP (ref 80–100)
MONOCYTES # BLD AUTO: 0.95 K/UL — HIGH (ref 0–0.9)
MONOCYTES NFR BLD AUTO: 9.4 % — SIGNIFICANT CHANGE UP (ref 2–14)
NEUTROPHILS # BLD AUTO: 7.5 K/UL — HIGH (ref 1.8–7.4)
NEUTROPHILS NFR BLD AUTO: 73.7 % — SIGNIFICANT CHANGE UP (ref 43–77)
PLATELET # BLD AUTO: 192 K/UL — SIGNIFICANT CHANGE UP (ref 150–400)
POTASSIUM SERPL-MCNC: 4.5 MMOL/L — SIGNIFICANT CHANGE UP (ref 3.5–5.3)
POTASSIUM SERPL-SCNC: 4.5 MMOL/L — SIGNIFICANT CHANGE UP (ref 3.5–5.3)
PROT SERPL-MCNC: 7.5 G/DL — SIGNIFICANT CHANGE UP (ref 6.6–8.7)
RBC # BLD: 4.34 M/UL — SIGNIFICANT CHANGE UP (ref 4.2–5.8)
RBC # FLD: 12.4 % — SIGNIFICANT CHANGE UP (ref 10.3–14.5)
SODIUM SERPL-SCNC: 140 MMOL/L — SIGNIFICANT CHANGE UP (ref 135–145)
WBC # BLD: 10.16 K/UL — SIGNIFICANT CHANGE UP (ref 3.8–10.5)
WBC # FLD AUTO: 10.16 K/UL — SIGNIFICANT CHANGE UP (ref 3.8–10.5)

## 2022-08-27 PROCEDURE — 73562 X-RAY EXAM OF KNEE 3: CPT | Mod: 26,LT

## 2022-08-27 PROCEDURE — 90715 TDAP VACCINE 7 YRS/> IM: CPT

## 2022-08-27 PROCEDURE — 99284 EMERGENCY DEPT VISIT MOD MDM: CPT | Mod: FS

## 2022-08-27 PROCEDURE — 73070 X-RAY EXAM OF ELBOW: CPT

## 2022-08-27 PROCEDURE — 80053 COMPREHEN METABOLIC PANEL: CPT

## 2022-08-27 PROCEDURE — 36415 COLL VENOUS BLD VENIPUNCTURE: CPT

## 2022-08-27 PROCEDURE — 73562 X-RAY EXAM OF KNEE 3: CPT

## 2022-08-27 PROCEDURE — 73070 X-RAY EXAM OF ELBOW: CPT | Mod: 26,RT

## 2022-08-27 PROCEDURE — 99284 EMERGENCY DEPT VISIT MOD MDM: CPT | Mod: 25

## 2022-08-27 PROCEDURE — 85025 COMPLETE CBC W/AUTO DIFF WBC: CPT

## 2022-08-27 PROCEDURE — 90471 IMMUNIZATION ADMIN: CPT

## 2022-08-27 RX ORDER — TETANUS TOXOID, REDUCED DIPHTHERIA TOXOID AND ACELLULAR PERTUSSIS VACCINE, ADSORBED 5; 2.5; 8; 8; 2.5 [IU]/.5ML; [IU]/.5ML; UG/.5ML; UG/.5ML; UG/.5ML
0.5 SUSPENSION INTRAMUSCULAR ONCE
Refills: 0 | Status: COMPLETED | OUTPATIENT
Start: 2022-08-27 | End: 2022-08-27

## 2022-08-27 RX ORDER — CEPHALEXIN 500 MG
1 CAPSULE ORAL
Qty: 14 | Refills: 0
Start: 2022-08-27 | End: 2022-09-02

## 2022-08-27 RX ADMIN — TETANUS TOXOID, REDUCED DIPHTHERIA TOXOID AND ACELLULAR PERTUSSIS VACCINE, ADSORBED 0.5 MILLILITER(S): 5; 2.5; 8; 8; 2.5 SUSPENSION INTRAMUSCULAR at 14:38

## 2022-08-27 NOTE — ED PROVIDER NOTE - NSFOLLOWUPINSTRUCTIONS_ED_ALL_ED_FT
Continue with Medication as prescribed    Ice to elbow   Ace Wrap   Elevate   Followup with Primary Care Physician
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
## 2022-08-27 NOTE — ED PROVIDER NOTE - NS ED ATTENDING STATEMENT MOD
This was a shared visit with the TAISHA. I reviewed and verified the documentation and independently performed the documented:

## 2022-08-27 NOTE — ED PROVIDER NOTE - PATIENT PORTAL LINK FT
You can access the FollowMyHealth Patient Portal offered by [facility] by registering at the following website: [email or web address]. By joining [employer]’s FollowMyHealth portal, you will also be able to view your health information using other applications (apps) compatible with our system.

## 2022-08-27 NOTE — ED ADULT NURSE NOTE - NSICDXPASTSURGICALHX_GEN_ALL_CORE_FT
PAST SURGICAL HISTORY:  H/O colonoscopy     H/O endoscopy     S/P release of urethral stricture

## 2022-08-27 NOTE — ED PROVIDER NOTE - ATTENDING APP SHARED VISIT CONTRIBUTION OF CARE
89M presenting for evaluation 2 days s/p fall now with redness and swelling at the elbow, on exam with swelling though intact ROM and minimally ttp, suspect he has post-traumatic bursitis, no erythema or warmth locally but there is a streak of erythema that moves towards his hand, concerning for maybe early infection. Will start abx out of caution given advanced age and fragility, also check labs, imaging for poss deep injury. No head trauma will defer head/neck imaging.     I have personally performed a face to face diagnostic evaluation on this patient.  I have reviewed the ACP note and agree with the history, exam, and plan of care, except as noted.   My medical decision making and observations are found above.

## 2022-08-27 NOTE — ED ADULT TRIAGE NOTE - CHIEF COMPLAINT QUOTE
Pt fell 2 days ago, had injury to right elbow, now abrasion is red , swollen and painful, no fevers.

## 2022-08-27 NOTE — ED PROVIDER NOTE - MUSCULOSKELETAL MINIMAL EXAM
motor intact/TENDERNESS left elbow with swelling in the posterior aspect that is minimally ttp, full ROM at the elbow/motor intact/TENDERNESS

## 2022-08-27 NOTE — ED PROVIDER NOTE - CARE PLAN
1 Principal Discharge DX:	Fall  Secondary Diagnosis:	Abrasion  Secondary Diagnosis:	Bursitis of upper limb   Principal Discharge DX:	Fall  Secondary Diagnosis:	Abrasion  Secondary Diagnosis:	Bursitis of upper limb  Secondary Diagnosis:	Asymptomatic hypertension

## 2022-08-27 NOTE — ED PROVIDER NOTE - PROGRESS NOTE DETAILS
No acute fracture/Normal imaging on ED read. Official Read and report pending Radiologist review in the Morning. Patient will be contacted if any additional findings  are made on official read.    Pt Labs are within normal limits and pt ands his daughter made aware of his Labs and imaging findings. Pt D/C with Rx sent to his Pharmacy. F/U with Primary [place] Physician as discussed. Saul

## 2023-02-10 NOTE — H&P PST ADULT - GENERAL
details… Imiquimod Counseling:  I discussed with the patient the risks of imiquimod including but not limited to erythema, scaling, itching, weeping, crusting, and pain.  Patient understands that the inflammatory response to imiquimod is variable from person to person and was educated regarded proper titration schedule.  If flu-like symptoms develop, patient knows to discontinue the medication and contact us.

## 2023-06-14 NOTE — H&P PST ADULT - PHYSICIAN'S ASSESSMENT OF RISK
Application Tool (Optional): Liquid Nitrogen Sprayer Consent: The patient's consent was obtained including but not limited to risks of crusting, scabbing, blistering, scarring, darker or lighter pigmentary change, recurrence, incomplete removal and infection. Number Of Freeze-Thaw Cycles: 2 freeze-thaw cycles Render Post-Care Instructions In Note?: no Post-Care Instructions: I reviewed with the patient in detail post-care instructions. Patient is to wear sunprotection, and avoid picking at any of the treated lesions. Pt may apply Vaseline to crusted or scabbing areas. Detail Level: Simple Duration Of Freeze Thaw-Cycle (Seconds): 2 Show Applicator Variable?: Yes Medium Risk
